# Patient Record
Sex: MALE | Race: WHITE | HISPANIC OR LATINO
[De-identification: names, ages, dates, MRNs, and addresses within clinical notes are randomized per-mention and may not be internally consistent; named-entity substitution may affect disease eponyms.]

---

## 2017-03-14 ENCOUNTER — APPOINTMENT (OUTPATIENT)
Dept: HEART AND VASCULAR | Facility: CLINIC | Age: 67
End: 2017-03-14

## 2017-06-13 ENCOUNTER — OUTPATIENT (OUTPATIENT)
Dept: OUTPATIENT SERVICES | Facility: HOSPITAL | Age: 67
LOS: 1 days | Discharge: HOME | End: 2017-06-13
Payer: MEDICARE

## 2017-06-13 ENCOUNTER — APPOINTMENT (OUTPATIENT)
Dept: CT IMAGING | Facility: HOSPITAL | Age: 67
End: 2017-06-13

## 2017-06-13 DIAGNOSIS — C71.9 MALIGNANT NEOPLASM OF BRAIN, UNSPECIFIED: ICD-10-CM

## 2017-06-13 PROCEDURE — 70450 CT HEAD/BRAIN W/O DYE: CPT | Mod: 26

## 2017-06-19 ENCOUNTER — INPATIENT (INPATIENT)
Facility: HOSPITAL | Age: 67
LOS: 3 days | Discharge: NURSING, REHAB OR LONG-TERM CARE | End: 2017-06-23
Attending: INTERNAL MEDICINE | Admitting: INTERNAL MEDICINE
Payer: MEDICARE

## 2017-06-19 VITALS
RESPIRATION RATE: 16 BRPM | OXYGEN SATURATION: 100 % | HEIGHT: 68 IN | DIASTOLIC BLOOD PRESSURE: 84 MMHG | SYSTOLIC BLOOD PRESSURE: 140 MMHG | HEART RATE: 80 BPM

## 2017-06-19 LAB
ALBUMIN SERPL ELPH-MCNC: 3.7 G/DL — SIGNIFICANT CHANGE UP (ref 3.3–5)
ALP SERPL-CCNC: 95 U/L — SIGNIFICANT CHANGE UP (ref 40–120)
ALT FLD-CCNC: 17 U/L — SIGNIFICANT CHANGE UP (ref 12–78)
ANION GAP SERPL CALC-SCNC: 9 MMOL/L — SIGNIFICANT CHANGE UP (ref 5–17)
APPEARANCE UR: CLEAR — SIGNIFICANT CHANGE UP
APTT BLD: 28 SEC — SIGNIFICANT CHANGE UP (ref 27.5–37.4)
AST SERPL-CCNC: 19 U/L — SIGNIFICANT CHANGE UP (ref 15–37)
BACTERIA # UR AUTO: ABNORMAL
BASOPHILS # BLD AUTO: 0.1 K/UL — SIGNIFICANT CHANGE UP (ref 0–0.2)
BASOPHILS NFR BLD AUTO: 1.9 % — SIGNIFICANT CHANGE UP (ref 0–2)
BILIRUB SERPL-MCNC: 0.4 MG/DL — SIGNIFICANT CHANGE UP (ref 0.2–1.2)
BILIRUB UR-MCNC: NEGATIVE — SIGNIFICANT CHANGE UP
BLD GP AB SCN SERPL QL: SIGNIFICANT CHANGE UP
BUN SERPL-MCNC: 23 MG/DL — SIGNIFICANT CHANGE UP (ref 7–23)
CALCIUM SERPL-MCNC: 8.8 MG/DL — SIGNIFICANT CHANGE UP (ref 8.5–10.1)
CHLORIDE SERPL-SCNC: 106 MMOL/L — SIGNIFICANT CHANGE UP (ref 96–108)
CO2 SERPL-SCNC: 28 MMOL/L — SIGNIFICANT CHANGE UP (ref 22–31)
COLOR SPEC: YELLOW — SIGNIFICANT CHANGE UP
CREAT SERPL-MCNC: 1.17 MG/DL — SIGNIFICANT CHANGE UP (ref 0.5–1.3)
DIFF PNL FLD: ABNORMAL
EOSINOPHIL # BLD AUTO: 0.2 K/UL — SIGNIFICANT CHANGE UP (ref 0–0.5)
EOSINOPHIL NFR BLD AUTO: 3.1 % — SIGNIFICANT CHANGE UP (ref 0–6)
EPI CELLS # UR: SIGNIFICANT CHANGE UP
ERYTHROCYTE [SEDIMENTATION RATE] IN BLOOD: 18 MM/HR — SIGNIFICANT CHANGE UP (ref 0–20)
GLUCOSE SERPL-MCNC: 142 MG/DL — HIGH (ref 70–99)
GLUCOSE UR QL: NEGATIVE MG/DL — SIGNIFICANT CHANGE UP
HCT VFR BLD CALC: 37.2 % — LOW (ref 39–50)
HGB BLD-MCNC: 13.7 G/DL — SIGNIFICANT CHANGE UP (ref 13–17)
INR BLD: 1.07 RATIO — SIGNIFICANT CHANGE UP (ref 0.88–1.16)
KETONES UR-MCNC: NEGATIVE — SIGNIFICANT CHANGE UP
LACTATE SERPL-SCNC: 1.9 MMOL/L — SIGNIFICANT CHANGE UP (ref 0.7–2)
LEUKOCYTE ESTERASE UR-ACNC: NEGATIVE — SIGNIFICANT CHANGE UP
LYMPHOCYTES # BLD AUTO: 1 K/UL — SIGNIFICANT CHANGE UP (ref 1–3.3)
LYMPHOCYTES # BLD AUTO: 17.3 % — SIGNIFICANT CHANGE UP (ref 13–44)
MCHC RBC-ENTMCNC: 31.3 PG — SIGNIFICANT CHANGE UP (ref 27–34)
MCHC RBC-ENTMCNC: 36.8 GM/DL — HIGH (ref 32–36)
MCV RBC AUTO: 84.9 FL — SIGNIFICANT CHANGE UP (ref 80–100)
MONOCYTES # BLD AUTO: 0.4 K/UL — SIGNIFICANT CHANGE UP (ref 0–0.9)
MONOCYTES NFR BLD AUTO: 7.7 % — SIGNIFICANT CHANGE UP (ref 2–14)
NEUTROPHILS # BLD AUTO: 3.9 K/UL — SIGNIFICANT CHANGE UP (ref 1.8–7.4)
NEUTROPHILS NFR BLD AUTO: 70.1 % — SIGNIFICANT CHANGE UP (ref 43–77)
NITRITE UR-MCNC: NEGATIVE — SIGNIFICANT CHANGE UP
PH UR: 5 — SIGNIFICANT CHANGE UP (ref 5–8)
PLATELET # BLD AUTO: 288 K/UL — SIGNIFICANT CHANGE UP (ref 150–400)
POTASSIUM SERPL-MCNC: 3.8 MMOL/L — SIGNIFICANT CHANGE UP (ref 3.5–5.3)
POTASSIUM SERPL-SCNC: 3.8 MMOL/L — SIGNIFICANT CHANGE UP (ref 3.5–5.3)
PROT SERPL-MCNC: 7.2 GM/DL — SIGNIFICANT CHANGE UP (ref 6–8.3)
PROT UR-MCNC: 15 MG/DL
PROTHROM AB SERPL-ACNC: 11.7 SEC — SIGNIFICANT CHANGE UP (ref 9.8–12.7)
RBC # BLD: 4.38 M/UL — SIGNIFICANT CHANGE UP (ref 4.2–5.8)
RBC # FLD: 11.8 % — SIGNIFICANT CHANGE UP (ref 11–15)
RBC CASTS # UR COMP ASSIST: ABNORMAL /HPF (ref 0–4)
SODIUM SERPL-SCNC: 143 MMOL/L — SIGNIFICANT CHANGE UP (ref 135–145)
SP GR SPEC: 1.02 — SIGNIFICANT CHANGE UP (ref 1.01–1.02)
TROPONIN I SERPL-MCNC: <.015 NG/ML — SIGNIFICANT CHANGE UP (ref 0.01–0.04)
TSH SERPL-MCNC: 1.67 UIU/ML — SIGNIFICANT CHANGE UP (ref 0.36–3.74)
UROBILINOGEN FLD QL: NEGATIVE MG/DL — SIGNIFICANT CHANGE UP
WBC # BLD: 5.6 K/UL — SIGNIFICANT CHANGE UP (ref 3.8–10.5)
WBC # FLD AUTO: 5.6 K/UL — SIGNIFICANT CHANGE UP (ref 3.8–10.5)
WBC UR QL: SIGNIFICANT CHANGE UP

## 2017-06-19 PROCEDURE — 70450 CT HEAD/BRAIN W/O DYE: CPT | Mod: 26

## 2017-06-19 PROCEDURE — 71010: CPT | Mod: 26

## 2017-06-19 PROCEDURE — 99291 CRITICAL CARE FIRST HOUR: CPT

## 2017-06-19 RX ORDER — DEXTROSE 50 % IN WATER 50 %
1 SYRINGE (ML) INTRAVENOUS ONCE
Qty: 0 | Refills: 0 | Status: DISCONTINUED | OUTPATIENT
Start: 2017-06-19 | End: 2017-06-23

## 2017-06-19 RX ORDER — INSULIN LISPRO 100/ML
VIAL (ML) SUBCUTANEOUS AT BEDTIME
Qty: 0 | Refills: 0 | Status: DISCONTINUED | OUTPATIENT
Start: 2017-06-19 | End: 2017-06-23

## 2017-06-19 RX ORDER — GLUCAGON INJECTION, SOLUTION 0.5 MG/.1ML
1 INJECTION, SOLUTION SUBCUTANEOUS ONCE
Qty: 0 | Refills: 0 | Status: DISCONTINUED | OUTPATIENT
Start: 2017-06-19 | End: 2017-06-23

## 2017-06-19 RX ORDER — VALSARTAN 80 MG/1
320 TABLET ORAL DAILY
Qty: 0 | Refills: 0 | Status: DISCONTINUED | OUTPATIENT
Start: 2017-06-19 | End: 2017-06-23

## 2017-06-19 RX ORDER — ENOXAPARIN SODIUM 100 MG/ML
40 INJECTION SUBCUTANEOUS DAILY
Qty: 0 | Refills: 0 | Status: DISCONTINUED | OUTPATIENT
Start: 2017-06-19 | End: 2017-06-23

## 2017-06-19 RX ORDER — ATORVASTATIN CALCIUM 80 MG/1
40 TABLET, FILM COATED ORAL AT BEDTIME
Qty: 0 | Refills: 0 | Status: DISCONTINUED | OUTPATIENT
Start: 2017-06-19 | End: 2017-06-23

## 2017-06-19 RX ORDER — DEXTROSE 50 % IN WATER 50 %
12.5 SYRINGE (ML) INTRAVENOUS ONCE
Qty: 0 | Refills: 0 | Status: DISCONTINUED | OUTPATIENT
Start: 2017-06-19 | End: 2017-06-23

## 2017-06-19 RX ORDER — DEXTROSE 50 % IN WATER 50 %
25 SYRINGE (ML) INTRAVENOUS ONCE
Qty: 0 | Refills: 0 | Status: DISCONTINUED | OUTPATIENT
Start: 2017-06-19 | End: 2017-06-23

## 2017-06-19 RX ORDER — ASPIRIN/CALCIUM CARB/MAGNESIUM 325 MG
325 TABLET ORAL DAILY
Qty: 0 | Refills: 0 | Status: DISCONTINUED | OUTPATIENT
Start: 2017-06-19 | End: 2017-06-23

## 2017-06-19 RX ORDER — INSULIN LISPRO 100/ML
VIAL (ML) SUBCUTANEOUS
Qty: 0 | Refills: 0 | Status: DISCONTINUED | OUTPATIENT
Start: 2017-06-19 | End: 2017-06-23

## 2017-06-19 RX ORDER — SODIUM CHLORIDE 9 MG/ML
1000 INJECTION, SOLUTION INTRAVENOUS
Qty: 0 | Refills: 0 | Status: DISCONTINUED | OUTPATIENT
Start: 2017-06-19 | End: 2017-06-23

## 2017-06-19 NOTE — ED ADULT NURSE NOTE - OBJECTIVE STATEMENT
per triage pt sent here to r/o CVA hx of recent cva with left sided weakness,  not speaking as usual

## 2017-06-19 NOTE — ED PROVIDER NOTE - MEDICAL DECISION MAKING DETAILS
after coming back from Ct, pt now more alert and answering questions. neurology on bard, not tpa canditate, possible syncope vs sz, vs stroke.

## 2017-06-19 NOTE — ED PROVIDER NOTE - PHYSICAL EXAMINATION
GEN: drowsy, mumbling but after CT now awake following commands and answering questions  HEAD: atraumatic, EOMI, PERRL, normal lids/conjunctiva  ENT: normal hearing, patent oropharynx without erythema/exudate, uvula midline, mild L facial droop seen unknown if new vs old  NECK: +supple, no tenderness/meningismus, +Trachea midline  PULM: Bilateral BS, normal resp effort, no wheeze/stridor/retractions  CV: RRR, no M/R/G, +dist ext pulses  ABD: soft, NT/ND  BACK: no CVAT, no midline tenderness  MSK: no edema/erythema/cyanosis  SKIN: no rash  NEURO: Alert, L sided weakness (motor +2) more than R (+3), L facial droop mild, mild aphasia GEN: drowsy, mumbling but after CT now awake following commands and answering questions  HEAD: atraumatic, EOMI, PERRL, normal lids/conjunctiva  ENT: normal hearing, patent oropharynx without erythema/exudate, uvula midline, mild L facial droop seen unknown if new vs old  NECK: +supple, no tenderness/meningismus, +Trachea midline  PULM: Bilateral BS, normal resp effort, no wheeze/stridor/retractions  CV: RRR, no M/R/G, +dist ext pulses  ABD: soft, NT/ND  BACK: no CVAT, no midline tenderness  MSK: no edema/erythema/cyanosis  SKIN: no rash  NEURO: Alert, L sided weakness (motor +2) more than R (+3), L facial droop mild, mild aphasia  NHSS=18

## 2017-06-19 NOTE — ED PROVIDER NOTE - PRESENTING SYMPTOMS DETAILS
67M w htn, hld, dm, cva w residual L sided weakness/aphasia, dementia, recent L basal ganglia stroke coming from [facility] comes in for possible strkoe vs syncope. per family , since dx of stroke a few days ago pt has gradually deteriorated now has R sided weakness from the stroke. yesterday noticed dysarthria that was worsening and today saw pt and he was not waking up, no sz-like activity, since then has not been very responsive. pt is moving parts of his extremities w pain stimulation, eyes are closed, airway protected and mumbles.  ROS - limited  Wife - Eveline [phone]

## 2017-06-19 NOTE — ED ADULT NURSE NOTE - PMH
CVA (cerebral vascular accident)    Diabetes    Hemiparesis affecting left side as late effect of stroke    HTN (hypertension)    Hypercholesteremia

## 2017-06-19 NOTE — ED ADULT TRIAGE NOTE - CHIEF COMPLAINT QUOTE
Stroke last week, sent By [facility] for sudden onset of no talking with family at 330p

## 2017-06-19 NOTE — H&P ADULT - NSHPPHYSICALEXAM_GEN_ALL_CORE
PHYSICAL EXAM:    GENERAL: NAD,   HEAD:  Atraumatic, Normocephalic  EYES:, PERRLA, conjunctiva and sclera clear  ENMT  clear oropharynx  NECK: Supple, No JVD, Normal thyroid  NERVOUS SYSTEM:  somnolent  arouseable  L  hemiparesis  decreased  strength  L  upper  extr  CHEST/LUNG: Clear  bilaterally; No rales, rhonchi, wheezing, or rubs  HEART: Regular rate and rhythm; No murmurs, rubs, or gallops  ABDOMEN: Soft, Nontender, Nondistended; no  masses Bowel sounds present  EXTREMITIES:  + Peripheral Pulses, No clubbing, cyanosis, or edema  LYMPH: No lymphadenopathy noted   RECTAL: deferred  SKIN: No rashes or lesions

## 2017-06-19 NOTE — H&P ADULT - HISTORY OF PRESENT ILLNESS
patient  with VA  L  hemiparesis  presented  with  recent  new  L  cva  2 weeks  ago  presented  with  unresponsiveness  rt  sided  weakness evaluated  in  ER  admitted  for  further w/u  and  treatment

## 2017-06-19 NOTE — ED ADULT NURSE NOTE - CHIEF COMPLAINT QUOTE
Stroke last week, sent By [facility] for sudden onset of no talking with family at 330p

## 2017-06-19 NOTE — H&P ADULT - NSHPLABSRESULTS_GEN_ALL_CORE
LABS:                        13.7   5.6   )-----------( 288      ( 2017 17:05 )             37.2     -    143  |  106  |  23  ----------------------------<  142<H>  3.8   |  28  |  1.17    Ca    8.8      2017 17:05    TPro  7.2  /  Alb  3.7  /  TBili  0.4  /  DBili  x   /  AST  19  /  ALT  17  /  AlkPhos  95  -    PT/INR - ( 2017 17:06 )   PT: 11.7 sec;   INR: 1.07 ratio         PTT - ( 2017 17:06 )  PTT:28.0 sec  Urinalysis Basic - ( 2017 18:32 )    Color: Yellow / Appearance: Clear / S.020 / pH: x  Gluc: x / Ketone: Negative  / Bili: Negative / Urobili: Negative mg/dL   Blood: x / Protein: 15 mg/dL / Nitrite: Negative   Leuk Esterase: Negative / RBC: 25-50 /HPF / WBC 3-5   Sq Epi: x / Non Sq Epi: Few / Bacteria: Few      EXAM:  CT BRAIN                            PROCEDURE DATE:  2017        INTERPRETATION:  Clinical: Stroke protocol    Technique: Axial CT tomographic sections of the head were performed   without contrast from the vertex to the foramen magnum. Additional   reformatted parasagittal and coronal MIP reconstructions were performed.     No intravenous contrast was administered.    Comparisons: CT brain dated 2017    Findings: The ventricular system demonstrates moderate generalized   dilatation. There is no evidence of midline shift, mass effect or   extra-axial fluid collections.     Cortical gyri and sulci demonstrate atrophy consistent with age.   Periventricular low density changes are present throughout the deep white   matter consistent with involutional changes of aging and microvascular   disease.. Old infarction is present in the medial aspect of the right   temporal lobe and right basal ganglia unchanged. There are no acute   density changes in the brain. There is no evidence of vasogenic edema,   mass or hemorrhage.     Posterior fossa, brainstem and cerebellum are normal. No masses in the   cerebellopontine angles. Bone window examination of the calvarium is   unremarkable.    Impression: Atrophy and microvascular disease consistent with age. Old   CVA right temporal lobe and right basal ganglia unchanged.

## 2017-06-20 DIAGNOSIS — I10 ESSENTIAL (PRIMARY) HYPERTENSION: ICD-10-CM

## 2017-06-20 DIAGNOSIS — I69.354 HEMIPLEGIA AND HEMIPARESIS FOLLOWING CEREBRAL INFARCTION AFFECTING LEFT NON-DOMINANT SIDE: ICD-10-CM

## 2017-06-20 DIAGNOSIS — R41.89 OTHER SYMPTOMS AND SIGNS INVOLVING COGNITIVE FUNCTIONS AND AWARENESS: ICD-10-CM

## 2017-06-20 DIAGNOSIS — I63.9 CEREBRAL INFARCTION, UNSPECIFIED: ICD-10-CM

## 2017-06-20 DIAGNOSIS — E11.9 TYPE 2 DIABETES MELLITUS WITHOUT COMPLICATIONS: ICD-10-CM

## 2017-06-20 LAB
CHOLEST SERPL-MCNC: 136 MG/DL — SIGNIFICANT CHANGE UP (ref 10–199)
CULTURE RESULTS: NO GROWTH — SIGNIFICANT CHANGE UP
HBA1C BLD-MCNC: 6.9 % — HIGH (ref 4–5.6)
HDLC SERPL-MCNC: 34 MG/DL — LOW (ref 40–125)
LIPID PNL WITH DIRECT LDL SERPL: 59 MG/DL — SIGNIFICANT CHANGE UP
SPECIMEN SOURCE: SIGNIFICANT CHANGE UP
T3 SERPL-MCNC: 90 NG/DL — SIGNIFICANT CHANGE UP (ref 80–200)
T4 AB SER-ACNC: 8.3 UG/DL — SIGNIFICANT CHANGE UP (ref 4.6–12)
TOTAL CHOLESTEROL/HDL RATIO MEASUREMENT: 4 RATIO — SIGNIFICANT CHANGE UP (ref 3.4–9.6)
TRIGL SERPL-MCNC: 216 MG/DL — HIGH (ref 10–149)

## 2017-06-20 PROCEDURE — 70551 MRI BRAIN STEM W/O DYE: CPT | Mod: 26

## 2017-06-20 PROCEDURE — 93880 EXTRACRANIAL BILAT STUDY: CPT | Mod: 26

## 2017-06-20 RX ORDER — AMLODIPINE BESYLATE 2.5 MG/1
10 TABLET ORAL DAILY
Qty: 0 | Refills: 0 | Status: DISCONTINUED | OUTPATIENT
Start: 2017-06-20 | End: 2017-06-23

## 2017-06-20 RX ORDER — WARFARIN SODIUM 2.5 MG/1
2.5 TABLET ORAL ONCE
Qty: 0 | Refills: 0 | Status: COMPLETED | OUTPATIENT
Start: 2017-06-20 | End: 2017-06-20

## 2017-06-20 RX ADMIN — Medication 325 MILLIGRAM(S): at 13:00

## 2017-06-20 RX ADMIN — ENOXAPARIN SODIUM 40 MILLIGRAM(S): 100 INJECTION SUBCUTANEOUS at 12:12

## 2017-06-20 RX ADMIN — Medication 1: at 12:11

## 2017-06-20 RX ADMIN — Medication 1: at 07:39

## 2017-06-20 RX ADMIN — Medication 2: at 17:41

## 2017-06-20 RX ADMIN — VALSARTAN 320 MILLIGRAM(S): 80 TABLET ORAL at 06:12

## 2017-06-20 RX ADMIN — WARFARIN SODIUM 2.5 MILLIGRAM(S): 2.5 TABLET ORAL at 23:08

## 2017-06-20 RX ADMIN — ATORVASTATIN CALCIUM 40 MILLIGRAM(S): 80 TABLET, FILM COATED ORAL at 00:27

## 2017-06-20 RX ADMIN — AMLODIPINE BESYLATE 10 MILLIGRAM(S): 2.5 TABLET ORAL at 06:16

## 2017-06-20 RX ADMIN — ATORVASTATIN CALCIUM 40 MILLIGRAM(S): 80 TABLET, FILM COATED ORAL at 23:08

## 2017-06-21 LAB
ALBUMIN SERPL ELPH-MCNC: 3.7 G/DL — SIGNIFICANT CHANGE UP (ref 3.3–5)
ALP SERPL-CCNC: 111 U/L — SIGNIFICANT CHANGE UP (ref 40–120)
ALT FLD-CCNC: 18 U/L — SIGNIFICANT CHANGE UP (ref 12–78)
ANION GAP SERPL CALC-SCNC: 10 MMOL/L — SIGNIFICANT CHANGE UP (ref 5–17)
APTT BLD: 28.7 SEC — SIGNIFICANT CHANGE UP (ref 27.5–37.4)
AST SERPL-CCNC: 20 U/L — SIGNIFICANT CHANGE UP (ref 15–37)
BILIRUB SERPL-MCNC: 0.7 MG/DL — SIGNIFICANT CHANGE UP (ref 0.2–1.2)
BUN SERPL-MCNC: 17 MG/DL — SIGNIFICANT CHANGE UP (ref 7–23)
CALCIUM SERPL-MCNC: 9 MG/DL — SIGNIFICANT CHANGE UP (ref 8.5–10.1)
CHLORIDE SERPL-SCNC: 105 MMOL/L — SIGNIFICANT CHANGE UP (ref 96–108)
CO2 SERPL-SCNC: 25 MMOL/L — SIGNIFICANT CHANGE UP (ref 22–31)
CREAT SERPL-MCNC: 1.04 MG/DL — SIGNIFICANT CHANGE UP (ref 0.5–1.3)
GLUCOSE SERPL-MCNC: 213 MG/DL — HIGH (ref 70–99)
HCT VFR BLD CALC: 39.2 % — SIGNIFICANT CHANGE UP (ref 39–50)
HGB BLD-MCNC: 14.5 G/DL — SIGNIFICANT CHANGE UP (ref 13–17)
INR BLD: 1.13 RATIO — SIGNIFICANT CHANGE UP (ref 0.88–1.16)
MCHC RBC-ENTMCNC: 30.9 PG — SIGNIFICANT CHANGE UP (ref 27–34)
MCHC RBC-ENTMCNC: 37.1 GM/DL — HIGH (ref 32–36)
MCV RBC AUTO: 83.1 FL — SIGNIFICANT CHANGE UP (ref 80–100)
NT-PROBNP SERPL-SCNC: 477 PG/ML — HIGH (ref 0–125)
PLATELET # BLD AUTO: 295 K/UL — SIGNIFICANT CHANGE UP (ref 150–400)
POTASSIUM SERPL-MCNC: 3.7 MMOL/L — SIGNIFICANT CHANGE UP (ref 3.5–5.3)
POTASSIUM SERPL-SCNC: 3.7 MMOL/L — SIGNIFICANT CHANGE UP (ref 3.5–5.3)
PROT SERPL-MCNC: 7.6 GM/DL — SIGNIFICANT CHANGE UP (ref 6–8.3)
PROTHROM AB SERPL-ACNC: 12.3 SEC — SIGNIFICANT CHANGE UP (ref 9.8–12.7)
RBC # BLD: 4.71 M/UL — SIGNIFICANT CHANGE UP (ref 4.2–5.8)
RBC # FLD: 11.2 % — SIGNIFICANT CHANGE UP (ref 11–15)
SODIUM SERPL-SCNC: 140 MMOL/L — SIGNIFICANT CHANGE UP (ref 135–145)
WBC # BLD: 8.6 K/UL — SIGNIFICANT CHANGE UP (ref 3.8–10.5)
WBC # FLD AUTO: 8.6 K/UL — SIGNIFICANT CHANGE UP (ref 3.8–10.5)

## 2017-06-21 RX ORDER — METOPROLOL TARTRATE 50 MG
5 TABLET ORAL ONCE
Qty: 0 | Refills: 0 | Status: COMPLETED | OUTPATIENT
Start: 2017-06-21 | End: 2017-06-21

## 2017-06-21 RX ADMIN — Medication 3: at 11:02

## 2017-06-21 RX ADMIN — Medication 5 MILLIGRAM(S): at 20:13

## 2017-06-21 RX ADMIN — Medication 2: at 15:52

## 2017-06-21 RX ADMIN — ENOXAPARIN SODIUM 40 MILLIGRAM(S): 100 INJECTION SUBCUTANEOUS at 11:02

## 2017-06-21 RX ADMIN — VALSARTAN 320 MILLIGRAM(S): 80 TABLET ORAL at 05:22

## 2017-06-21 RX ADMIN — AMLODIPINE BESYLATE 10 MILLIGRAM(S): 2.5 TABLET ORAL at 05:22

## 2017-06-21 RX ADMIN — Medication 2: at 08:17

## 2017-06-21 RX ADMIN — Medication 325 MILLIGRAM(S): at 11:02

## 2017-06-21 RX ADMIN — ATORVASTATIN CALCIUM 40 MILLIGRAM(S): 80 TABLET, FILM COATED ORAL at 23:01

## 2017-06-21 NOTE — PROGRESS NOTE ADULT - SUBJECTIVE AND OBJECTIVE BOX
INTERVAL HPI/OVERNIGHT EVENTS:        REVIEW OF SYSTEMS:  CONSTITUTIONAL:  patient  somnolent  arouseable  poorly  communicative    MEDICATION:  valsartan 320milliGRAM(s) Oral daily  aspirin buffered 325milliGRAM(s) Oral daily  atorvastatin 40milliGRAM(s) Oral at bedtime  enoxaparin Injectable 40milliGRAM(s) SubCutaneous daily  insulin lispro (HumaLOG) corrective regimen sliding scale  SubCutaneous three times a day before meals  insulin lispro (HumaLOG) corrective regimen sliding scale  SubCutaneous at bedtime  dextrose 5%. 1000milliLiter(s) IV Continuous <Continuous>  dextrose Gel 1Dose(s) Oral once PRN  dextrose 50% Injectable 12.5Gram(s) IV Push once  dextrose 50% Injectable 25Gram(s) IV Push once  dextrose 50% Injectable 25Gram(s) IV Push once  glucagon  Injectable 1milliGRAM(s) IntraMuscular once PRN  amLODIPine   Tablet 10milliGRAM(s) Oral daily    Vital Signs Last 24 Hrs  T(C): 37.1, Max: 37.7 ( @ 17:44)  T(F): 98.8, Max: 99.8 ( @ 17:44)  HR: 108 (94 - 108)  BP: 174/98 (147/93 - 174/98)  BP(mean): --  RR: 20 (17 - 20)  SpO2: 95% (95% - 98%)    PHYSICAL EXAM:  GENERAL: NAD,   EYES:  conjunctiva and sclera clear  ENMT:  Moist mucous membranes,   NECK: Supple, No JVD, Normal thyroid  NERVOUS SYSTEM:  somnolent   arouseble  poorly communicative  L  hemiparesis ;   CHEST/LUNG: Clear    HEART: Regular rate and rhythm; No murmurs, rubs, or gallops  ABDOMEN: Soft, Nontender, Nondistended; Bowel sounds present  EXTREMITIES:  no  edema no  tenderness  SKIN: No rashes   LABS:                        13.7   5.6   )-----------( 288      ( 2017 17:05 )             37.2     -    143  |  106  |  23  ----------------------------<  142<H>  3.8   |  28  |  1.17    Ca    8.8      2017 17:05    TPro  7.2  /  Alb  3.7  /  TBili  0.4  /  DBili  x   /  AST  19  /  ALT  17  /  AlkPhos  95  06-19    PT/INR - ( 2017 17:06 )   PT: 11.7 sec;   INR: 1.07 ratio         PTT - ( 2017 17:06 )  PTT:28.0 sec  Urinalysis Basic - ( 2017 18:32 )    Color: Yellow / Appearance: Clear / S.020 / pH: x  Gluc: x / Ketone: Negative  / Bili: Negative / Urobili: Negative mg/dL   Blood: x / Protein: 15 mg/dL / Nitrite: Negative   Leuk Esterase: Negative / RBC: 25-50 /HPF / WBC 3-5   Sq Epi: x / Non Sq Epi: Few / Bacteria: Few      CAPILLARY BLOOD GLUCOSE  183 (2017 23:07)  220 (2017 17:40)  171 (2017 12:09)  161 (2017 07:37)      RADIOLOGY & ADDITIONAL TESTS:    Imaging reports  Personally Reviewed:  [x ] YES  [ ] NO    Consultant(s) Notes Reviewed:  [ ] YES  [ ] NO    Care Discussed with Consultants/Other Providers [x ] YES  [ ] NO  AM:  MRI BRAIN W O CONT                            PROCEDURE DATE:  2017        INTERPRETATION:  INDICATION:    Stroke  TECHNIQUE:  Multiplanar brain imaging was conducted using a 1.5 Kimberly   magnet.  T1, T2 and FLAIR imaging was performed. In addition, diffusion   imaging, diffusion coefficient assessment (ADC) and echo planar T2* was   incorporated.    COMPARISON EXAMINATION:  CT dated 2017    FINDINGS:  HEMISPHERES:  No diffusion restriction or acute ischemia is noted. There   are extensive chronic ischemic changes in both hemispheres. There are   multiple lacunar infarcts in the basal ganglia thalami and   periventricular white matter. Several appear to have been hemorrhagic as   there is hemosiderin deposition. Also thereare chronic ischemic changes   more prevalent in the right MCA distribution posteriorly.  VENTRICLES:  Ex vacuo enlargement is seen  POSTERIOR FOSSA:  No acute infarct or hemorrhage is noted. Chronic   ischemic changes are noted in the right paracentral catie. There is   Wallerian degeneration in the right midbrain.  EXTRA-AXIAL:  No mass or collections are depicted.  CRANIAL NERVES:  No abnormality noted.  VASCULATURE:  The major vessels and venous sinuses are grossly patent.    SINUSES AND MASTOIDS:  Clear.  MISCELLANEOUS:  No orbital or pituitary abnormality noted.  No skull base   lesion suggested.    IMPRESSION:      1)  extensive chronic ischemic changes throughout both hemispheres more   severe in the right hemisphere. See above. No diffusion restriction or MR   evidence of acute ischemia  2)  chronic ischemic changes in the right brainstem. Wallerian   degeneration also noted in the right midbrain.    No acute abnormality suggested..     TTE   Summary:   1. Left ventricular ejection fraction, by visual estimation, is 60 to   65%.   2. Spectral Doppler shows restrictive pattern of left ventricular   myocardial filling (Grade III diastolic dysfunction).   3. Normal right ventricular size and function.   4. The left atrium is normal in size.   5. The right atrium is normal in size.   6. There is no evidence of pericardial effusion.   7. Structurally normal mitral valve, with normal leaflet excursion.   8. Trace mitral valve regurgitation.   9. Structurally normal tricuspid valve, with normal leaflet excursion.  10. Normal trileaflet aortic valve with normal opening.      INTERPRETATION:  Carotid duplex Doppler ultrasound    Indication: Altered mental status    Carotid duplex Doppler ultrasound is performed. Grayscale ultrasound   demonstrates mild atherosclerotic plaques in the left carotid bulb and   proximal right internal carotid artery.. The flow velocity measurements   during peak systolic phase in centimeters per second are as the following:

## 2017-06-21 NOTE — CONSULT NOTE ADULT - SUBJECTIVE AND OBJECTIVE BOX
Subjective Complaints:  Historian:       Consult requested by ER doctor:                  Attending:     HPI:  patient  with VA  L  hemiparesis  presented  with  recent  new  L  cva  2 weeks  ago  presented  with  unresponsiveness  rt  sided  weakness evaluated  in  ER  admitted  for  further w/u  and  treatment (19 Jun 2017 19:13)    BRANDI MUHAMMAD    PAST MEDICAL & SURGICAL HISTORY:  Hemiparesis affecting left side as late effect of stroke  Hypercholesteremia  Diabetes  CVA (cerebral vascular accident)  HTN (hypertension)  No significant past surgical history  No significant past surgical history  67yMale    MEDICATIONS  (STANDING):  valsartan 320milliGRAM(s) Oral daily  aspirin buffered 325milliGRAM(s) Oral daily  atorvastatin 40milliGRAM(s) Oral at bedtime  enoxaparin Injectable 40milliGRAM(s) SubCutaneous daily  insulin lispro (HumaLOG) corrective regimen sliding scale  SubCutaneous three times a day before meals  insulin lispro (HumaLOG) corrective regimen sliding scale  SubCutaneous at bedtime  dextrose 5%. 1000milliLiter(s) IV Continuous <Continuous>  dextrose 50% Injectable 12.5Gram(s) IV Push once  dextrose 50% Injectable 25Gram(s) IV Push once  dextrose 50% Injectable 25Gram(s) IV Push once  amLODIPine   Tablet 10milliGRAM(s) Oral daily    MEDICATIONS  (PRN):  dextrose Gel 1Dose(s) Oral once PRN Blood Glucose LESS THAN 70 milliGRAM(s)/deciliter  glucagon  Injectable 1milliGRAM(s) IntraMuscular once PRN Glucose LESS THAN 70 milligrams/deciliter      Allergies    No Known Allergies    Intolerances      FAMILY HISTORY:  No pertinent family history in first degree relatives  No significant family history      REVIEW OF SYSTEMS:  General:  No wt loss, fevers, chills, night sweats  Eyes:  Good vision, no reported pain  ENT:  No sore throat, pain, runny nose, dysphagia  CV:  No pain, palpitatioins, hypo/hypertension  Resp:  No dyspnea, cough, tachypnea, wheezing  GI:  No pain, nausea, vomiting, diarrhea, constipatiion  :  No pain, bleeding, incontinence, nocturia  Muscle:  No pain, weakness  Breast:  No pain, abscess, mass, discharge  Neuro:  left sided weakness  Psych:  No fatigue, insomnia, mood problems, depression  Endocrine:  No polyuria, polydypsia, cold/heat intolerance  Heme:  No petechiae, ecchymosis, easy bruisability  Skin:  No rash, tattoos, scars, edema      Vital Signs Last 24 Hrs  T(C): 37, Max: 37.2 (06-21 @ 01:26)  T(F): 98.6, Max: 98.9 (06-21 @ 01:26)  HR: 105 (94 - 108)  BP: 163/105 (147/93 - 174/98)  BP(mean): --  RR: 16 (16 - 20)  SpO2: 98% (95% - 98%)    GENERAL PHYSICAL EXAM:  General:  Appears stated age, well-groomed, well-nourished, no distress  HEENT:  NC/AT, patent nares w/ pink mucosa, OP clear w/o lesions, PERRL, EOMI, conjunctivae clear, no thyromegaly, nodules, adenopathy, no JVD  Chest:  Full & symmetric excursion, no increased effort, breath sounds clear  Cardiovascular:  Regular rhythm, S1, S2, no murmur/rub/S3/S4, no carotid/femoral/abdominal bruit, radial/pedal pulses 2+, no edema  Abdomen:  Soft, non-tender, non-distended, normoactive bowel sounds, no HSM  Extremities: left wrist contracture  Skin:  No rash/erythema/ecchymoses/petechiae/wounds/abscess/warm/dry    NEUROLOGICAL EXAM:  HENT:  Normocephalic head; atraumatic head.  Neck supple.  ENT: normal looking.  Mental State:    Awake,. paucity of speech able to comprehend spoken language  Cranial Nerves:  II-XII:   Pupils round and reactive to light and accommodation.  Extraocular movements full.  Visual fields full (no homonymous hemianopsia).  Visual acuity wnl.  Facial symmetry intact.  Tongue midline.  Motor Functions: left upper extremity 2/5 with contracture at the wrist left lower extremity is 2/5 right recurrent episodes of tremors right lower extremity is 3-4/5    Sensory Functions: unreliable   Reflexes:  Deep tendon reflexes normoactive to biceps, knees and ankles.  Babinski absent (present).  Cerebellar Testing:    Finger to nose intact.  Nystagmus absent.Gait unable to stand    LABS:                        14.5   8.6   )-----------( 295      ( 21 Jun 2017 07:15 )             39.2     06-21    140  |  105  |  17  ----------------------------<  213<H>  3.7   |  25  |  1.04    Ca    9.0      21 Jun 2017 07:15    TPro  7.6  /  Alb  3.7  /  TBili  0.7  /  DBili  x   /  AST  20  /  ALT  18  /  AlkPhos  111  06-21    PT/INR - ( 21 Jun 2017 07:15 )   PT: 12.3 sec;   INR: 1.13 ratio         PTT - ( 21 Jun 2017 07:15 )  PTT:28.7 sec        RADIOLOGY & ADDITIONAL STUDIES:    Prothrombin Time and INR, Plasma:  Start Date:21-Jun-2017. 05:00 (06-21 @ 05:39)  Activated Partial Thromboplastin Time:  Start Date:21-Jun-2017. 05:00 (06-21 @ 05:39)  EEG Awake or Drowsy:   Transport: Stretcher-Crib  Hemorrhage:No  Brain Death: No  MI:No  Sickle Cell:No   Stimulants:No  Anti-Convulsants:No   Anti-Depressants:No  Contr Substances:No   Ordering Provider&#x27;s Pager/Contact #[phone] (06-21 @ 07:23)  Complete Blood Count: AM Sched. Collection: 22-Jun-2017 05:00 (06-21 @ 07:23)  Comprehensive Metabolic Panel: AM Sched. Collection: 22-Jun-2017 05:00 (06-21 @ 07:23)  Diet, Pureed:   Consistent Carbohydrate No Snacks (06-21 @ 09:49)  metoprolol Injectable: [Ordered as LOPRESSOR Injectable]  5 milliGRAM(s), IV Push, once, Stop After 1 Doses  Provider&#x27;s Contact #: [phone] (06-21 @ 19:27) [completed]      Assessment & Opinion:67 y o man with previous cva with secondary left hemiparesis is found to have difficulty expressing himself and recurrent involuntary mvts of the right upper extremity may be experiencing seizure   DX SEIZURE    Recommendations:  Brain MRI.   EEG.   DVT prophylaxis as ordered.pLAN : START ON KEPPRA   Medications:

## 2017-06-22 LAB
ALBUMIN SERPL ELPH-MCNC: 3.5 G/DL — SIGNIFICANT CHANGE UP (ref 3.3–5)
ALP SERPL-CCNC: 99 U/L — SIGNIFICANT CHANGE UP (ref 40–120)
ALT FLD-CCNC: 18 U/L — SIGNIFICANT CHANGE UP (ref 12–78)
ANION GAP SERPL CALC-SCNC: 9 MMOL/L — SIGNIFICANT CHANGE UP (ref 5–17)
AST SERPL-CCNC: 21 U/L — SIGNIFICANT CHANGE UP (ref 15–37)
BILIRUB SERPL-MCNC: 0.7 MG/DL — SIGNIFICANT CHANGE UP (ref 0.2–1.2)
BUN SERPL-MCNC: 20 MG/DL — SIGNIFICANT CHANGE UP (ref 7–23)
CALCIUM SERPL-MCNC: 8.8 MG/DL — SIGNIFICANT CHANGE UP (ref 8.5–10.1)
CHLORIDE SERPL-SCNC: 105 MMOL/L — SIGNIFICANT CHANGE UP (ref 96–108)
CO2 SERPL-SCNC: 26 MMOL/L — SIGNIFICANT CHANGE UP (ref 22–31)
CREAT SERPL-MCNC: 1.15 MG/DL — SIGNIFICANT CHANGE UP (ref 0.5–1.3)
GLUCOSE SERPL-MCNC: 187 MG/DL — HIGH (ref 70–99)
HCT VFR BLD CALC: 37 % — LOW (ref 39–50)
HGB BLD-MCNC: 14.3 G/DL — SIGNIFICANT CHANGE UP (ref 13–17)
MCHC RBC-ENTMCNC: 32 PG — SIGNIFICANT CHANGE UP (ref 27–34)
MCHC RBC-ENTMCNC: 38.6 GM/DL — HIGH (ref 32–36)
MCV RBC AUTO: 82.9 FL — SIGNIFICANT CHANGE UP (ref 80–100)
PLATELET # BLD AUTO: 284 K/UL — SIGNIFICANT CHANGE UP (ref 150–400)
POTASSIUM SERPL-MCNC: 3.9 MMOL/L — SIGNIFICANT CHANGE UP (ref 3.5–5.3)
POTASSIUM SERPL-SCNC: 3.9 MMOL/L — SIGNIFICANT CHANGE UP (ref 3.5–5.3)
PROT SERPL-MCNC: 7.1 GM/DL — SIGNIFICANT CHANGE UP (ref 6–8.3)
RBC # BLD: 4.46 M/UL — SIGNIFICANT CHANGE UP (ref 4.2–5.8)
RBC # FLD: 11.3 % — SIGNIFICANT CHANGE UP (ref 11–15)
SODIUM SERPL-SCNC: 140 MMOL/L — SIGNIFICANT CHANGE UP (ref 135–145)
WBC # BLD: 7.9 K/UL — SIGNIFICANT CHANGE UP (ref 3.8–10.5)
WBC # FLD AUTO: 7.9 K/UL — SIGNIFICANT CHANGE UP (ref 3.8–10.5)

## 2017-06-22 RX ORDER — DIVALPROEX SODIUM 500 MG/1
250 TABLET, DELAYED RELEASE ORAL THREE TIMES A DAY
Qty: 0 | Refills: 0 | Status: DISCONTINUED | OUTPATIENT
Start: 2017-06-22 | End: 2017-06-23

## 2017-06-22 RX ADMIN — Medication 325 MILLIGRAM(S): at 12:00

## 2017-06-22 RX ADMIN — ENOXAPARIN SODIUM 40 MILLIGRAM(S): 100 INJECTION SUBCUTANEOUS at 12:00

## 2017-06-22 RX ADMIN — ATORVASTATIN CALCIUM 40 MILLIGRAM(S): 80 TABLET, FILM COATED ORAL at 22:40

## 2017-06-22 RX ADMIN — DIVALPROEX SODIUM 250 MILLIGRAM(S): 500 TABLET, DELAYED RELEASE ORAL at 22:40

## 2017-06-22 RX ADMIN — AMLODIPINE BESYLATE 10 MILLIGRAM(S): 2.5 TABLET ORAL at 05:45

## 2017-06-22 RX ADMIN — DIVALPROEX SODIUM 250 MILLIGRAM(S): 500 TABLET, DELAYED RELEASE ORAL at 15:24

## 2017-06-22 RX ADMIN — Medication 3: at 11:59

## 2017-06-22 RX ADMIN — Medication 1: at 08:15

## 2017-06-22 NOTE — PROGRESS NOTE ADULT - SUBJECTIVE AND OBJECTIVE BOX
INTERVAL HPI/OVERNIGHT EVENTS:    EEG  unable  to  be  performed  6/21/17    REVIEW OF SYSTEMS:  CONSTITUTIONAL:  patient  alert  comfortable  states feels  better      MEDICATION:  valsartan 320milliGRAM(s) Oral daily  aspirin buffered 325milliGRAM(s) Oral daily  atorvastatin 40milliGRAM(s) Oral at bedtime  enoxaparin Injectable 40milliGRAM(s) SubCutaneous daily  insulin lispro (HumaLOG) corrective regimen sliding scale  SubCutaneous three times a day before meals  insulin lispro (HumaLOG) corrective regimen sliding scale  SubCutaneous at bedtime  dextrose 5%. 1000milliLiter(s) IV Continuous <Continuous>  dextrose Gel 1Dose(s) Oral once PRN  dextrose 50% Injectable 12.5Gram(s) IV Push once  dextrose 50% Injectable 25Gram(s) IV Push once  dextrose 50% Injectable 25Gram(s) IV Push once  glucagon  Injectable 1milliGRAM(s) IntraMuscular once PRN  amLODIPine   Tablet 10milliGRAM(s) Oral daily    Vital Signs Last 24 Hrs  T(C): 36.6, Max: 37 (06-21 @ 17:00)  T(F): 97.8, Max: 98.6 (06-21 @ 17:00)  HR: 85 (85 - 107)  BP: 110/67 (110/67 - 168/93)  BP(mean): --  RR: 16 (16 - 19)  SpO2: 98% (96% - 98%)    PHYSICAL EXAM:  GENERAL: NAD,  EYES:  conjunctiva and sclera clear  ENMT:  Moist mucous membranes,   NECK: Supple, No JVD, Normal thyroid  NERVOUS SYSTEM:  Alert follows commands   L  hemiparesis  CHEST/LUNG: Clear    HEART: Regular rate and rhythm; No murmurs, rubs, or gallops  ABDOMEN: Soft, Nontender, Nondistended; Bowel sounds present  EXTREMITIES:  no  edema no  tenderness  SKIN: No rashes   LABS:                        14.3   7.9   )-----------( 284      ( 22 Jun 2017 06:15 )             37.0     06-22    140  |  105  |  20  ----------------------------<  187<H>  3.9   |  26  |  1.15    Ca    8.8      22 Jun 2017 06:15    TPro  7.1  /  Alb  3.5  /  TBili  0.7  /  DBili  x   /  AST  21  /  ALT  18  /  AlkPhos  99  06-22    PT/INR - ( 21 Jun 2017 07:15 )   PT: 12.3 sec;   INR: 1.13 ratio         PTT - ( 21 Jun 2017 07:15 )  PTT:28.7 sec    CAPILLARY BLOOD GLUCOSE  171 (21 Jun 2017 22:59)  201 (21 Jun 2017 15:53)  207 (21 Jun 2017 08:19)      RADIOLOGY & ADDITIONAL TESTS:    Imaging reports  Personally Reviewed:  [x ] YES  [ ] NO    Consultant(s) Notes Reviewed:  [x ] YES  [ ] NO    Care Discussed with Consultants/Other Providers [ x] YES  [ ] NO  Assessment and Plan:   Problem/Plan - 1:  ·  Problem: CVA (cerebral vascular accident).  Plan: asa lipitor awaiting  EEG add  Keppra  if  +    Problem/Plan - 2:  ·  Problem: Hemiparesis affecting left side as late effect of stroke.  Plan: as  above patient     Problem/Plan - 3:  ·  Problem: Unresponsive episode.  Plan: atypical seizures  Awating  neurology  EEG.     Problem/Plan - 4:  ·  Problem: Diabetes.  Plan: regular  insulin  cooverage.     Problem/Plan - 5:  ·  Problem: HTN (hypertension).  Plan: norvasc  diovan.   D/D ben

## 2017-06-22 NOTE — DIETITIAN INITIAL EVALUATION ADULT. - PERTINENT LABORATORY DATA
06-22 Na140 mmol/L Glu 187 mg/dL<H> K+ 3.9 mmol/L Cr  1.15 mg/dL BUN 20 mg/dL Phos n/a   Alb 3.5 g/dL PAB n/a. 6/20- Chol 136, TG's 216H, HDL 34L, HgbA1c 6.9H.

## 2017-06-22 NOTE — DIETITIAN INITIAL EVALUATION ADULT. - OTHER INFO
68yo M seen for coumadin ed (note not currently on coumadin, order completed 6/20) & consult for HgbA1c= 6.9. Pt s/p CVA c L sided hemiparesis, on pureed diet, not seen by SLP. Pt from [facility], per transfer sheets pt on Low Na NCS Chopped consistency c thin liquids c Glytrol 4 oz daily. Pt was on metformin & gabapentin @ center PTA. Observed 50% of lunch tray consumption @ bedside. Last BM x 3 6/21.

## 2017-06-22 NOTE — PROGRESS NOTE ADULT - SUBJECTIVE AND OBJECTIVE BOX
Subjective Complaints:  Historian: record review        REVIEW OF SYSTEMS:  Eyes:  Good vision, no reported pain  ENT:  No sore throat, pain, runny nose, dysphagia  CV:  No pain, palpitatioins, hypo/hypertension  Resp:  No dyspnea, cough, tachypnea, wheezing  GI:  No pain, nausea, vomiting, diarrhea, constipatiion  Muscle:  No pain, weakness constant ttremors of the right upper extremity  Neuro:   weakness, tingling,pATIENT IS VERY FORGETFUL  Psych:  No fatigue, insomnia, mood problems, depression  Endocrine:  No polyuria, polydypsia, cold/heat intolerance    Vital Signs Last 24 Hrs  T(C): 36.7, Max: 37 (06-21 @ 17:00)  T(F): 98, Max: 98.6 (06-21 @ 17:00)  HR: 79 (79 - 107)  BP: 165/91 (110/67 - 167/105)  BP(mean): --  RR: 17 (16 - 19)  SpO2: 96% (96% - 98%)    GENERAL PHYSICAL EXAM:  General:  Appears stated age, well-groomed, well-nourished, no distress  HEENT:  NC/AT, patent nares w/ pink mucosa, OP clear w/o lesions, PERRL, EOMI, conjunctivae clear, no thyromegaly, nodules, adenopathy, no JVD  Chest:  Full & symmetric excursion, no increased effort, breath sounds clear  Cardiovascular:  Regular rhythm, S1, S2, no murmur/rub/S3/S4, no carotid/femoral/abdominal bruit, radial/pedal pulses 2+, no edema  Abdomen:  Soft, non-tender, non-distended, normoactive bowel sounds, no HSM  Extremities:  Gait & station:   Digits:   Nails:   Joints, Bones, Muscles:   ROM:   Stability:  Skin:  No rash/erythema/ecchymoses/petechiae/wounds/abscess/warm/dry  Musculoskeletal:  Full ROM in all joints w/o swelling/tenderness/effusion    NEUROLOGICAL EXAM:  HENT:  Normocephalic head; atraumatic head.  Neck supple.  ENT: normal looking.  Mental State:    Alert.  Fully oriented to person, place and date.  Coherent.  Speech clear and intact.  Cooperative.  Responds appropriately.    Cranial Nerves:  II-XII:   Pupils round and reactive to light and accommodation.  Extraocular movements full.  Visual fields full (no homonymous hemianopsia).  Visual acuity wnl.  Facial symmetry intact.  Tongue midline.  Motor Functions:  LEFT UPPER EXTREMITY WEAKNESS right upper extremity tremors   Sensory Functions:   Intact to touch and pinprick to face and extremities.    Reflexes:  Deep tendon reflexes normoactive to biceps, knees and ankles.  Babinski absent (present).  Cerebellar Testing:    Finger to nose intact.  Nystagmus absent.  Neurovascular: Carotid auscultation full without bruits.      LABS:                        14.3   7.9   )-----------( 284      ( 22 Jun 2017 06:15 )             37.0     06-22    140  |  105  |  20  ----------------------------<  187<H>  3.9   |  26  |  1.15    Ca    8.8      22 Jun 2017 06:15    TPro  7.1  /  Alb  3.5  /  TBili  0.7  /  DBili  x   /  AST  21  /  ALT  18  /  AlkPhos  99  06-22    PT/INR - ( 21 Jun 2017 07:15 )   PT: 12.3 sec;   INR: 1.13 ratio         PTT - ( 21 Jun 2017 07:15 )  PTT:28.7 sec        RADIOLOGY & ADDITIONAL STUDIES:    metoprolol Injectable: [Ordered as LOPRESSOR Injectable]  5 milliGRAM(s), IV Push, once, Stop After 1 Doses  Provider&#x27;s Contact #: [phone] (06-21 @ 19:27) [completed]  Provider to RN:       Hold AM losartan (06-22 @ 05:39)  DX TREMOR  vs SEIZURE    plan ;many attempts were made to perform an EEG but patient was uncooperativeand he will be statrted on a trial of AEDS AND DISCHARGE PATIENT WILL BE FOLLOWED BY HIS OUTSIDE NEUROLOGIST    Recommendations:  Brain MRI.  Carotid doppler.  Echocardiogram.  EEG.   DVT prophylaxis as ordered.  Medications:

## 2017-06-23 VITALS
TEMPERATURE: 100 F | DIASTOLIC BLOOD PRESSURE: 81 MMHG | RESPIRATION RATE: 20 BRPM | SYSTOLIC BLOOD PRESSURE: 133 MMHG | OXYGEN SATURATION: 98 % | HEART RATE: 113 BPM

## 2017-06-23 RX ORDER — ASPIRIN/CALCIUM CARB/MAGNESIUM 325 MG
325 TABLET ORAL
Qty: 0 | Refills: 0 | DISCHARGE
Start: 2017-06-23

## 2017-06-23 RX ORDER — DIVALPROEX SODIUM 500 MG/1
1 TABLET, DELAYED RELEASE ORAL
Qty: 0 | Refills: 0 | DISCHARGE
Start: 2017-06-23

## 2017-06-23 RX ADMIN — Medication 1: at 08:04

## 2017-06-23 RX ADMIN — Medication 1: at 12:08

## 2017-06-23 RX ADMIN — AMLODIPINE BESYLATE 10 MILLIGRAM(S): 2.5 TABLET ORAL at 06:59

## 2017-06-23 RX ADMIN — ENOXAPARIN SODIUM 40 MILLIGRAM(S): 100 INJECTION SUBCUTANEOUS at 12:09

## 2017-06-23 RX ADMIN — DIVALPROEX SODIUM 250 MILLIGRAM(S): 500 TABLET, DELAYED RELEASE ORAL at 06:59

## 2017-06-23 RX ADMIN — DIVALPROEX SODIUM 250 MILLIGRAM(S): 500 TABLET, DELAYED RELEASE ORAL at 13:34

## 2017-06-23 RX ADMIN — Medication 325 MILLIGRAM(S): at 12:09

## 2017-06-23 RX ADMIN — VALSARTAN 320 MILLIGRAM(S): 80 TABLET ORAL at 06:59

## 2017-06-23 NOTE — DISCHARGE NOTE ADULT - SECONDARY DIAGNOSIS.
Seizure disorder as sequela of cerebrovascular accident CVA (cerebral vascular accident) Diabetes Hemiparesis affecting left side as late effect of stroke HTN (hypertension) Hypercholesteremia

## 2017-06-23 NOTE — DISCHARGE NOTE ADULT - PLAN OF CARE
avoidance  of  recurrence secondary  stroke  prevention avoidance of  recurrence observe  on  valproic  acid

## 2017-06-23 NOTE — PROGRESS NOTE ADULT - SUBJECTIVE AND OBJECTIVE BOX
INTERVAL HPI/OVERNIGHT EVENTS:        REVIEW OF SYSTEMS:  CONSTITUTIONAL:  alert  comfortable        MEDICATION:  valsartan 320milliGRAM(s) Oral daily  aspirin buffered 325milliGRAM(s) Oral daily  atorvastatin 40milliGRAM(s) Oral at bedtime  enoxaparin Injectable 40milliGRAM(s) SubCutaneous daily  insulin lispro (HumaLOG) corrective regimen sliding scale  SubCutaneous three times a day before meals  insulin lispro (HumaLOG) corrective regimen sliding scale  SubCutaneous at bedtime  dextrose 5%. 1000milliLiter(s) IV Continuous <Continuous>  dextrose Gel 1Dose(s) Oral once PRN  dextrose 50% Injectable 12.5Gram(s) IV Push once  dextrose 50% Injectable 25Gram(s) IV Push once  dextrose 50% Injectable 25Gram(s) IV Push once  glucagon  Injectable 1milliGRAM(s) IntraMuscular once PRN  amLODIPine   Tablet 10milliGRAM(s) Oral daily  diVALproex DR 250milliGRAM(s) Oral three times a day    Vital Signs Last 24 Hrs  T(C): 31.7, Max: 37.2 (06-23 @ 00:53)  T(F): 89, Max: 98.9 (06-23 @ 00:53)  HR: 89 (79 - 100)  BP: 173/88 (131/78 - 173/88)  BP(mean): --  RR: 20 (16 - 20)  SpO2: 97% (96% - 100%)    PHYSICAL EXAM:  GENERAL: NAD,  EYES:  conjunctiva and sclera clear  ENMT:  Moist mucous membranes,   NECK: Supple, No JVD, Normal thyroid  NERVOUS SYSTEM:  Alert responds  episodically  L  hemiparesis  CHEST/LUNG: Clear    HEART: Regular rate and rhythm; No murmurs, rubs, or gallops  ABDOMEN: Soft, Nontender, Nondistended; Bowel sounds present  EXTREMITIES:  no  edema no  tenderness  SKIN: No rashes   LABS:                        14.3   7.9   )-----------( 284      ( 22 Jun 2017 06:15 )             37.0     06-22    140  |  105  |  20  ----------------------------<  187<H>  3.9   |  26  |  1.15    Ca    8.8      22 Jun 2017 06:15    TPro  7.1  /  Alb  3.5  /  TBili  0.7  /  DBili  x   /  AST  21  /  ALT  18  /  AlkPhos  99  06-22        CAPILLARY BLOOD GLUCOSE  165 (23 Jun 2017 07:59)  192 (22 Jun 2017 22:16)  150 (22 Jun 2017 17:28)  276 (22 Jun 2017 11:10)      RADIOLOGY & ADDITIONAL TESTS:    Imaging reports  Personally Reviewed:  [x YES  [ ] NO    Consultant(s) Notes Reviewed:  [x ] YES  [ ] NO    Care Discussed with Consultants/Other Providers [x ] YES  [ ] NO  Assessment and Plan:   Problem/Plan - 1:  ·  Problem: CVA (cerebral vascular accident).  Plan: asa lipitor  discussed with neurologist  unable  to do  EEG  at  this  time  will start anti epileptic  therapy  empirically  further  treatment  as per  clinical  course    Problem/Plan - 2:  ·  Problem: Hemiparesis affecting left side as late effect of stroke.  Plan: as  above patient     Problem/Plan - 3:  ·  Problem: Unresponsive episode.  Plan: atypical seizures  Awating  neurology  EEG.     Problem/Plan - 4:  ·  Problem: Diabetes.  Plan: regular  insulin  coverage.     Problem/Plan - 5:  ·  Problem: HTN (hypertension).  Plan: norvasc  diovan.   discharge  to  rehab results  and  treatment  paln discussed  with Pt"s   wife  at  bedside

## 2017-06-23 NOTE — DISCHARGE NOTE ADULT - HOSPITAL COURSE
patient  treated  with  asa  statin  monitored  on  telemetry  had  episodes  of  poor  responsiveness was  started  empirically   on  valproic  acid  unable  to  do  EEg  because  of  pt's  uncooperation  TTE  carotid  doppler  no  significant  findings  MRI  with  multiple  strokes  some described  as  having  been hemorrhagic  patient  discharged  on  increased  asa  statin  plavix  discontinued  discussed  with  pt's  wife  at  bedside  aware  of  poor  long  term  prognosis  given  the  recurrence  of  CVAs  in spite good of  secondary  prevention  therapy  and  response

## 2017-06-23 NOTE — DISCHARGE NOTE ADULT - CARE PROVIDER_API CALL
Dion Valenzuela), [facility]  [address] [place] [address]  Phone: [phone]  Fax: [phone]

## 2017-06-23 NOTE — DISCHARGE NOTE ADULT - CARE PLAN
Principal Discharge DX:	Unresponsive episode  Goal:	avoidance  of  recurrence  Instructions for follow-up, activity and diet:	secondary  stroke  prevention  Secondary Diagnosis:	Seizure disorder as sequela of cerebrovascular accident  Goal:	avoidance of  recurrence  Instructions for follow-up, activity and diet:	observe  on  valproic  acid  Secondary Diagnosis:	CVA (cerebral vascular accident)  Secondary Diagnosis:	Diabetes  Secondary Diagnosis:	Hemiparesis affecting left side as late effect of stroke  Secondary Diagnosis:	HTN (hypertension)  Secondary Diagnosis:	Hypercholesteremia

## 2017-06-23 NOTE — DISCHARGE NOTE ADULT - MEDICATION SUMMARY - MEDICATIONS TO STOP TAKING
I will STOP taking the medications listed below when I get home from the hospital:    Aspir 81 81 mg oral tablet  -- 1 tab(s) by mouth once a day    docusate sodium 100 mg oral capsule  -- 1 cap(s) by mouth 2 times a day    Plavix 75 mg oral tablet  -- 1 tab(s) by mouth once a day    sertraline 25 mg oral tablet  -- 1 tab(s) by mouth once a day    gabapentin 300 mg oral tablet  --  by mouth

## 2017-06-23 NOTE — DISCHARGE NOTE ADULT - MEDICATION SUMMARY - MEDICATIONS TO TAKE
I will START or STAY ON the medications listed below when I get home from the hospital:    aspirin buffered  -- 325 milligram(s) by mouth once a day  -- Indication: For CVA (cerebral vascular accident)    acetaminophen 325 mg oral tablet  -- 2 tab(s) by mouth every 6 hours, As needed, Mild Pain  -- Indication: For pain    valsartan 320 mg oral tablet  -- 1 tab(s) by mouth once a day  -- Indication: For HTN (hypertension)    divalproex sodium 250 mg oral delayed release tablet  -- 1 tab(s) by mouth 3 times a day  -- Indication: For seizure    metFORMIN 500 mg oral tablet  -- 1 tab(s) by mouth 2 times a day  -- Indication: For Diabetes    atorvastatin 20 mg oral tablet  -- 1 tab(s) by mouth once a day (at bedtime)  -- Indication: For Diabetes cva    amLODIPine 10 mg oral tablet  -- 1 tab(s) by mouth once a day  -- Indication: For HTN (hypertension)    Senna Lax  --  by mouth   -- Indication: For Constipation    senna oral tablet  -- 2 tab(s) by mouth once a day (at bedtime)  -- Indication: For Duplicate  order    omeprazole 20 mg oral delayed release capsule  -- 1 cap(s) by mouth once a day  -- Indication: For gerd

## 2017-06-23 NOTE — DISCHARGE NOTE ADULT - PATIENT PORTAL LINK FT
“You can access the [facility] Patient Portal, offered by [facility], by registering with the following website: [email or web address]”

## 2017-06-25 LAB
CULTURE RESULTS: SIGNIFICANT CHANGE UP
CULTURE RESULTS: SIGNIFICANT CHANGE UP
SPECIMEN SOURCE: SIGNIFICANT CHANGE UP
SPECIMEN SOURCE: SIGNIFICANT CHANGE UP

## 2017-06-26 DIAGNOSIS — Z53.8 PROCEDURE AND TREATMENT NOT CARRIED OUT FOR OTHER REASONS: ICD-10-CM

## 2017-06-26 DIAGNOSIS — I10 ESSENTIAL (PRIMARY) HYPERTENSION: ICD-10-CM

## 2017-06-26 DIAGNOSIS — R53.1 WEAKNESS: ICD-10-CM

## 2017-06-26 DIAGNOSIS — R56.9 UNSPECIFIED CONVULSIONS: ICD-10-CM

## 2017-06-26 DIAGNOSIS — Z79.02 LONG TERM (CURRENT) USE OF ANTITHROMBOTICS/ANTIPLATELETS: ICD-10-CM

## 2017-06-26 DIAGNOSIS — E11.9 TYPE 2 DIABETES MELLITUS WITHOUT COMPLICATIONS: ICD-10-CM

## 2017-06-26 DIAGNOSIS — R25.1 TREMOR, UNSPECIFIED: ICD-10-CM

## 2017-06-26 DIAGNOSIS — E78.00 PURE HYPERCHOLESTEROLEMIA, UNSPECIFIED: ICD-10-CM

## 2017-06-26 DIAGNOSIS — I69.354 HEMIPLEGIA AND HEMIPARESIS FOLLOWING CEREBRAL INFARCTION AFFECTING LEFT NON-DOMINANT SIDE: ICD-10-CM

## 2017-06-26 DIAGNOSIS — Z79.84 LONG TERM (CURRENT) USE OF ORAL HYPOGLYCEMIC DRUGS: ICD-10-CM

## 2017-06-26 DIAGNOSIS — Z79.82 LONG TERM (CURRENT) USE OF ASPIRIN: ICD-10-CM

## 2017-06-26 DIAGNOSIS — G31.84 MILD COGNITIVE IMPAIRMENT OF UNCERTAIN OR UNKNOWN ETIOLOGY: ICD-10-CM

## 2019-06-09 ENCOUNTER — INPATIENT (INPATIENT)
Facility: HOSPITAL | Age: 69
LOS: 8 days | Discharge: HOSPICE | End: 2019-06-18
Attending: INTERNAL MEDICINE | Admitting: INTERNAL MEDICINE
Payer: MEDICARE

## 2019-06-09 VITALS
OXYGEN SATURATION: 96 % | DIASTOLIC BLOOD PRESSURE: 111 MMHG | HEIGHT: 73 IN | TEMPERATURE: 98 F | RESPIRATION RATE: 19 BRPM | SYSTOLIC BLOOD PRESSURE: 192 MMHG | HEART RATE: 108 BPM | WEIGHT: 130.07 LBS

## 2019-06-09 DIAGNOSIS — Z11.59 ENCOUNTER FOR SCREENING FOR OTHER VIRAL DISEASES: ICD-10-CM

## 2019-06-09 DIAGNOSIS — I63.9 CEREBRAL INFARCTION, UNSPECIFIED: ICD-10-CM

## 2019-06-09 DIAGNOSIS — I69.354 HEMIPLEGIA AND HEMIPARESIS FOLLOWING CEREBRAL INFARCTION AFFECTING LEFT NON-DOMINANT SIDE: ICD-10-CM

## 2019-06-09 DIAGNOSIS — I10 ESSENTIAL (PRIMARY) HYPERTENSION: ICD-10-CM

## 2019-06-09 DIAGNOSIS — I50.32 CHRONIC DIASTOLIC (CONGESTIVE) HEART FAILURE: ICD-10-CM

## 2019-06-09 DIAGNOSIS — Z86.73 PERSONAL HISTORY OF TRANSIENT ISCHEMIC ATTACK (TIA), AND CEREBRAL INFARCTION WITHOUT RESIDUAL DEFICITS: ICD-10-CM

## 2019-06-09 DIAGNOSIS — E78.00 PURE HYPERCHOLESTEROLEMIA, UNSPECIFIED: ICD-10-CM

## 2019-06-09 DIAGNOSIS — E11.9 TYPE 2 DIABETES MELLITUS WITHOUT COMPLICATIONS: ICD-10-CM

## 2019-06-09 DIAGNOSIS — E43 UNSPECIFIED SEVERE PROTEIN-CALORIE MALNUTRITION: ICD-10-CM

## 2019-06-09 LAB
ALBUMIN SERPL ELPH-MCNC: 3.8 G/DL — SIGNIFICANT CHANGE UP (ref 3.3–5)
ALP SERPL-CCNC: 138 U/L — HIGH (ref 40–120)
ALT FLD-CCNC: 16 U/L — SIGNIFICANT CHANGE UP (ref 12–78)
ANION GAP SERPL CALC-SCNC: 10 MMOL/L — SIGNIFICANT CHANGE UP (ref 5–17)
APPEARANCE UR: CLEAR — SIGNIFICANT CHANGE UP
APTT BLD: 28.4 SEC — SIGNIFICANT CHANGE UP (ref 27.5–36.3)
AST SERPL-CCNC: 20 U/L — SIGNIFICANT CHANGE UP (ref 15–37)
BILIRUB SERPL-MCNC: 1.1 MG/DL — SIGNIFICANT CHANGE UP (ref 0.2–1.2)
BILIRUB UR-MCNC: NEGATIVE — SIGNIFICANT CHANGE UP
BUN SERPL-MCNC: 23 MG/DL — SIGNIFICANT CHANGE UP (ref 7–23)
CALCIUM SERPL-MCNC: 9.1 MG/DL — SIGNIFICANT CHANGE UP (ref 8.5–10.1)
CHLORIDE SERPL-SCNC: 104 MMOL/L — SIGNIFICANT CHANGE UP (ref 96–108)
CO2 SERPL-SCNC: 25 MMOL/L — SIGNIFICANT CHANGE UP (ref 22–31)
COLOR SPEC: YELLOW — SIGNIFICANT CHANGE UP
CREAT SERPL-MCNC: 1.04 MG/DL — SIGNIFICANT CHANGE UP (ref 0.5–1.3)
DIFF PNL FLD: NEGATIVE — SIGNIFICANT CHANGE UP
GLUCOSE BLDC GLUCOMTR-MCNC: 209 MG/DL — HIGH (ref 70–99)
GLUCOSE BLDC GLUCOMTR-MCNC: 261 MG/DL — HIGH (ref 70–99)
GLUCOSE SERPL-MCNC: 235 MG/DL — HIGH (ref 70–99)
GLUCOSE UR QL: 250 MG/DL
HCT VFR BLD CALC: 40.3 % — SIGNIFICANT CHANGE UP (ref 39–50)
HGB BLD-MCNC: 13.8 G/DL — SIGNIFICANT CHANGE UP (ref 13–17)
INR BLD: 1.16 RATIO — SIGNIFICANT CHANGE UP (ref 0.88–1.16)
KETONES UR-MCNC: NEGATIVE — SIGNIFICANT CHANGE UP
LEUKOCYTE ESTERASE UR-ACNC: NEGATIVE — SIGNIFICANT CHANGE UP
LIDOCAIN IGE QN: 194 U/L — SIGNIFICANT CHANGE UP (ref 73–393)
MCHC RBC-ENTMCNC: 29.3 PG — SIGNIFICANT CHANGE UP (ref 27–34)
MCHC RBC-ENTMCNC: 34.2 GM/DL — SIGNIFICANT CHANGE UP (ref 32–36)
MCV RBC AUTO: 85.6 FL — SIGNIFICANT CHANGE UP (ref 80–100)
NITRITE UR-MCNC: NEGATIVE — SIGNIFICANT CHANGE UP
NRBC # BLD: 0 /100 WBCS — SIGNIFICANT CHANGE UP (ref 0–0)
PH UR: 6.5 — SIGNIFICANT CHANGE UP (ref 5–8)
PLATELET # BLD AUTO: 361 K/UL — SIGNIFICANT CHANGE UP (ref 150–400)
POTASSIUM SERPL-MCNC: 3.8 MMOL/L — SIGNIFICANT CHANGE UP (ref 3.5–5.3)
POTASSIUM SERPL-SCNC: 3.8 MMOL/L — SIGNIFICANT CHANGE UP (ref 3.5–5.3)
PROT SERPL-MCNC: 8.2 GM/DL — SIGNIFICANT CHANGE UP (ref 6–8.3)
PROT UR-MCNC: 15 MG/DL
PROTHROM AB SERPL-ACNC: 13 SEC — HIGH (ref 10–12.9)
RBC # BLD: 4.71 M/UL — SIGNIFICANT CHANGE UP (ref 4.2–5.8)
RBC # FLD: 12.7 % — SIGNIFICANT CHANGE UP (ref 10.3–14.5)
RBC CASTS # UR COMP ASSIST: NEGATIVE /HPF — SIGNIFICANT CHANGE UP (ref 0–4)
SODIUM SERPL-SCNC: 139 MMOL/L — SIGNIFICANT CHANGE UP (ref 135–145)
SP GR SPEC: 1.01 — SIGNIFICANT CHANGE UP (ref 1.01–1.02)
TROPONIN I SERPL-MCNC: <.015 NG/ML — SIGNIFICANT CHANGE UP (ref 0.01–0.04)
UROBILINOGEN FLD QL: NEGATIVE MG/DL — SIGNIFICANT CHANGE UP
WBC # BLD: 6.85 K/UL — SIGNIFICANT CHANGE UP (ref 3.8–10.5)
WBC # FLD AUTO: 6.85 K/UL — SIGNIFICANT CHANGE UP (ref 3.8–10.5)
WBC UR QL: NEGATIVE — SIGNIFICANT CHANGE UP

## 2019-06-09 PROCEDURE — 70450 CT HEAD/BRAIN W/O DYE: CPT | Mod: 26

## 2019-06-09 PROCEDURE — 99223 1ST HOSP IP/OBS HIGH 75: CPT

## 2019-06-09 PROCEDURE — 71045 X-RAY EXAM CHEST 1 VIEW: CPT | Mod: 26

## 2019-06-09 PROCEDURE — 99285 EMERGENCY DEPT VISIT HI MDM: CPT

## 2019-06-09 PROCEDURE — 93010 ELECTROCARDIOGRAM REPORT: CPT

## 2019-06-09 RX ORDER — HYDRALAZINE HCL 50 MG
10 TABLET ORAL EVERY 4 HOURS
Refills: 0 | Status: DISCONTINUED | OUTPATIENT
Start: 2019-06-09 | End: 2019-06-18

## 2019-06-09 RX ORDER — PANTOPRAZOLE SODIUM 20 MG/1
40 TABLET, DELAYED RELEASE ORAL
Refills: 0 | Status: DISCONTINUED | OUTPATIENT
Start: 2019-06-09 | End: 2019-06-11

## 2019-06-09 RX ORDER — DOCUSATE SODIUM 100 MG
100 CAPSULE ORAL
Refills: 0 | Status: DISCONTINUED | OUTPATIENT
Start: 2019-06-09 | End: 2019-06-18

## 2019-06-09 RX ORDER — SODIUM CHLORIDE 9 MG/ML
1000 INJECTION, SOLUTION INTRAVENOUS
Refills: 0 | Status: DISCONTINUED | OUTPATIENT
Start: 2019-06-09 | End: 2019-06-18

## 2019-06-09 RX ORDER — DIVALPROEX SODIUM 500 MG/1
250 TABLET, DELAYED RELEASE ORAL THREE TIMES A DAY
Refills: 0 | Status: DISCONTINUED | OUTPATIENT
Start: 2019-06-09 | End: 2019-06-10

## 2019-06-09 RX ORDER — SIMVASTATIN 20 MG/1
40 TABLET, FILM COATED ORAL AT BEDTIME
Refills: 0 | Status: DISCONTINUED | OUTPATIENT
Start: 2019-06-09 | End: 2019-06-18

## 2019-06-09 RX ORDER — DEXTROSE 50 % IN WATER 50 %
12.5 SYRINGE (ML) INTRAVENOUS ONCE
Refills: 0 | Status: DISCONTINUED | OUTPATIENT
Start: 2019-06-09 | End: 2019-06-18

## 2019-06-09 RX ORDER — ATORVASTATIN CALCIUM 80 MG/1
20 TABLET, FILM COATED ORAL AT BEDTIME
Refills: 0 | Status: DISCONTINUED | OUTPATIENT
Start: 2019-06-09 | End: 2019-06-09

## 2019-06-09 RX ORDER — GLUCAGON INJECTION, SOLUTION 0.5 MG/.1ML
1 INJECTION, SOLUTION SUBCUTANEOUS ONCE
Refills: 0 | Status: DISCONTINUED | OUTPATIENT
Start: 2019-06-09 | End: 2019-06-18

## 2019-06-09 RX ORDER — METOPROLOL TARTRATE 50 MG
5 TABLET ORAL EVERY 6 HOURS
Refills: 0 | Status: DISCONTINUED | OUTPATIENT
Start: 2019-06-09 | End: 2019-06-18

## 2019-06-09 RX ORDER — LABETALOL HCL 100 MG
10 TABLET ORAL ONCE
Refills: 0 | Status: COMPLETED | OUTPATIENT
Start: 2019-06-09 | End: 2019-06-09

## 2019-06-09 RX ORDER — SENNA PLUS 8.6 MG/1
2 TABLET ORAL AT BEDTIME
Refills: 0 | Status: DISCONTINUED | OUTPATIENT
Start: 2019-06-09 | End: 2019-06-18

## 2019-06-09 RX ORDER — LOSARTAN POTASSIUM 100 MG/1
100 TABLET, FILM COATED ORAL DAILY
Refills: 0 | Status: DISCONTINUED | OUTPATIENT
Start: 2019-06-09 | End: 2019-06-18

## 2019-06-09 RX ORDER — ASPIRIN/CALCIUM CARB/MAGNESIUM 324 MG
81 TABLET ORAL DAILY
Refills: 0 | Status: DISCONTINUED | OUTPATIENT
Start: 2019-06-09 | End: 2019-06-18

## 2019-06-09 RX ORDER — POLYETHYLENE GLYCOL 3350 17 G/17G
17 POWDER, FOR SOLUTION ORAL DAILY
Refills: 0 | Status: DISCONTINUED | OUTPATIENT
Start: 2019-06-09 | End: 2019-06-18

## 2019-06-09 RX ORDER — AMLODIPINE BESYLATE 2.5 MG/1
10 TABLET ORAL DAILY
Refills: 0 | Status: DISCONTINUED | OUTPATIENT
Start: 2019-06-09 | End: 2019-06-18

## 2019-06-09 RX ORDER — DEXTROSE 50 % IN WATER 50 %
15 SYRINGE (ML) INTRAVENOUS ONCE
Refills: 0 | Status: DISCONTINUED | OUTPATIENT
Start: 2019-06-09 | End: 2019-06-18

## 2019-06-09 RX ORDER — ENOXAPARIN SODIUM 100 MG/ML
40 INJECTION SUBCUTANEOUS EVERY 24 HOURS
Refills: 0 | Status: DISCONTINUED | OUTPATIENT
Start: 2019-06-09 | End: 2019-06-18

## 2019-06-09 RX ORDER — INSULIN LISPRO 100/ML
VIAL (ML) SUBCUTANEOUS
Refills: 0 | Status: DISCONTINUED | OUTPATIENT
Start: 2019-06-09 | End: 2019-06-10

## 2019-06-09 RX ADMIN — Medication 10 MILLIGRAM(S): at 16:20

## 2019-06-09 RX ADMIN — Medication 10 MILLIGRAM(S): at 20:01

## 2019-06-09 RX ADMIN — ENOXAPARIN SODIUM 40 MILLIGRAM(S): 100 INJECTION SUBCUTANEOUS at 20:02

## 2019-06-09 NOTE — ED PROVIDER NOTE - OBJECTIVE STATEMENT
Pt is a 70 yo gentleman with a pmhx of HTN, NIDM, CVA x 2 who presents to the ED with weakness, and new L. side facial droop starting 2 days ago. No change in mental status otherwise, but does have decreased strength, and is not taking anything orally anymore which is off baseline. No complaints of any pain, no n/v/d. No fevers. Has had increased urinary frequency. No foul odor.

## 2019-06-09 NOTE — H&P ADULT - ATTENDING COMMENTS
GENERAL: NAD, well-groomed, well-developed  HEAD:  Atraumatic, Normocephalic  EYES: conjunctiva and sclera clear  ENMT; Moist mucous membranes  CHEST/LUNG: Clear to ascultation bilaterally; No rales, rhonchi, wheezing, or rubs  HEART: Regular rate and rhythm; No murmurs, rubs, or gallops  ABDOMEN: Soft, Nontender, mildly distended; Bowel sounds present  EXTREMITIES:  contracted left UE, 2+ Peripheral Pulses, No clubbing, cyanosis, or edema  NERVOUS SYSTEM:  Awake, non-verbal, does not follow commands, resting tremor on right UE, unable to examine remaining neuro exam

## 2019-06-09 NOTE — ED PROVIDER NOTE - PROGRESS NOTE DETAILS
Pt mental status stable. Straight cath unable to provide urine sample, Dr. Valenzuela and Dr. Arciniega consulted for admission.

## 2019-06-09 NOTE — ED ADULT TRIAGE NOTE - CHIEF COMPLAINT QUOTE
patient BIBA , as per wife Kalani , patient  is not talking and has facial droop for 2 days , patient had a stroke x2 , he has L side residue from the previos one , as per wife facial droop is since 2 daysa go

## 2019-06-09 NOTE — H&P ADULT - ASSESSMENT
Assessment: L-sided weakness, r/o CVA (out of window for TPA)    PLAN:  - Admit to unit: Telemetry  - ASA, statin  - Continue home meds  - Diet: Puree w/ nectar thick  - Services:    [Speech/OT/PT]  - EKG, MRI brain, TTE, carotid doppler  - CBC, BMP, UA, urine Cx  - Dr. Arciniega (neurology) called by ED  - Consider cardiology consult  - VTE PPx: Lovenox sq  - GI PPx: Protonix  - Discussed with Dr. Quinteros aware and agrees with the plan  - Will continue to follow up    Time spent on encounter 90 minutes. Assessment: L-sided weakness, r/o CVA (out of window for TPA)    PLAN:  - Admit to unit: Telemetry  - ASA, statin  - Continue home meds  - Diet: Puree w/ nectar thick  - Services: S&S, PT, OT, nutrition  - EKG, MRI brain, TTE, carotid doppler  - CBC, BMP, UA, urine Cx  - Dr. Arciniega (neurology) called by ED MD  - Consider cardiology consult  - VTE PPx: Lovenox sq  - GI PPx: Protonix  - Discussed with Dr. Quinteros aware and agrees with the plan  - Will continue to follow up    Time spent on encounter 90 minutes. Patient is a 68 y/o M w/ PMH HTN, DM, HLD, CVA x2 presenting to ED w/ L-sided facial droop, decreased speech, decreased appetite x 2 days. Patient will require admission for at least 2 midnights as detailed below:    IMPROVE VTE Individual Risk Assessment          RISK                                                          Points    [  ] Previous VTE                                                3    [  ] Thrombophilia                                             2    [  ] Lower limb paralysis                                   2        (unable to hold up >15 seconds)      [  ] Current Cancer                                            2         (within 6 months)    [  ] Immobilization > 24 hrs                             1    [  ] ICU/CCU stay > 24 hours                           1    [  ] Age > 60                                                       1    IMPROVE VTE Score: 4    PLAN:  - Admit to unit: Telemetry  - ASA, statin  - Continue home meds  - Diet: Puree w/ nectar thick  - Services: S&S, PT, OT, nutrition  - EKG, MRI brain, TTE, carotid doppler  - CBC, BMP, UA, urine Cx  - Dr. Arciniega (neurology) called by ED MD  - Consider cardiology consult  - VTE PPx: Lovenox sq  - GI PPx: Protonix  - Discussed with Dr. Quinteros aware and agrees with the plan  - Will continue to follow up    Time spent on encounter 90 minutes.

## 2019-06-09 NOTE — ED PROVIDER NOTE - CLINICAL SUMMARY MEDICAL DECISION MAKING FREE TEXT BOX
Ddx: ro CVA, NIH SS 1 from baseline w L. facial droop that is new per wife/ occult infection/ failure to thrive  Plan: Ct head, cbc, cmp, lipase, ua, ucx, neuro consult, reassess

## 2019-06-09 NOTE — ED ADULT NURSE REASSESSMENT NOTE - NS ED NURSE REASSESS COMMENT FT1
Patient lying comfortably on stretcher. No acute signs of distress. Patient wife by the bedside. Handoff given to RN Isael.

## 2019-06-09 NOTE — ED ADULT NURSE NOTE - NSIMPLEMENTINTERV_GEN_ALL_ED
Implemented All Fall with Harm Risk Interventions:  [place] to call system. Call bell, personal items and telephone within reach. Instruct patient to call for assistance. Room bathroom lighting operational. Non-slip footwear when patient is off stretcher. Physically safe environment: no spills, clutter or unnecessary equipment. Stretcher in lowest position, wheels locked, appropriate side rails in place. Provide visual cue, wrist band, yellow gown, etc. Monitor gait and stability. Monitor for mental status changes and reorient to person, place, and time. Review medications for side effects contributing to fall risk. Reinforce activity limits and safety measures with patient and family. Provide visual clues: red socks.

## 2019-06-09 NOTE — H&P ADULT - NSICDXPASTMEDICALHX_GEN_ALL_CORE_FT
PAST MEDICAL HISTORY:  CVA (cerebral vascular accident)     Diabetes     Hemiparesis affecting left side as late effect of stroke     HTN (hypertension)     Hypercholesteremia

## 2019-06-09 NOTE — H&P ADULT - HISTORY OF PRESENT ILLNESS
Patient admitted to Dr. Valenzuela. Unable to see patient within 3 hours. H&P and initial orders by hospitalist team. Patient to be followed up for continuity of care with Dr. Valenzuela in 24h.  HPI: Patient is a 70 y/o M w/ PMH HTN, DM, HLD, CVA x2 presenting to ED w/ L-sided facial droop, decreased speech, decreased appetite x 2 days. Per patient's wife patient's baseline mental status is minimally verbal with decreased use of L-side. Wife states patient had been sleeping more lately since starting Trazodone and not been able to give his other medications as prescribed due to increased somnolence.    In ED CT head done - negative for acute hemorrhagic stroke, chronic ischemic changes. Dr. Arciniega called. Patient admitted to Dr. Valenzuela. Unable to see patient within 3 hours. H&P and initial orders by hospitalist team. Patient to be followed up for continuity of care with Dr. Valenzuela in 24h.    HPI: Patient is a 68 y/o M w/ PMH HTN, DM, HLD, CVA x2 presenting to ED w/ L-sided facial droop, decreased speech, decreased appetite x 2 days. Per patient's wife patient's baseline mental status is minimally verbal with decreased use of L-side. Wife states patient had been sleeping more lately since starting Trazodone and not been able to give his other medications as prescribed due to increased somnolence.    In ED CT head done - negative for acute hemorrhagic stroke, chronic ischemic changes. Dr. Arciniega called.

## 2019-06-09 NOTE — CONSULT NOTE ADULT - ASSESSMENT
Subjective Complaints:      Consult requested by ER doctor:                  Attending:     History of Present Illness:  Chief Complaint/Reason for Admission:  History of Present Illness:  HPI:  Patient admitted to Dr. Valenzuela. Unable to see patient within 3 hours. H&P and initial orders by hospitalist team. Patient to be followed up for continuity of care with Dr. Valenzuela in 24h.    HPI: Patient is a 68 y/o M w/ PMH HTN, DM, HLD, CVA x2 presenting to ED w/ L-sided facial droop, decreased speech, decreased appetite x 2 days. Per patient's wife patient's baseline mental status is minimally verbal with decreased use of L-side. Wife states patient had been sleeping more lately since starting Trazodone and not been able to give his other medications as prescribed due to increased somnolence.    In ED CT head done - negative for acute hemorrhagic stroke, chronic ischemic changes. Dr. Arciniega called. (2019 16:36)        PAST MEDICAL & SURGICAL HISTORY:  Hemiparesis affecting left side as late effect of stroke  Hypercholesteremia  Diabetes  CVA (cerebral vascular accident)  HTN (hypertension)  No significant past surgical history  69yMale    MEDICATIONS  (STANDING):  amLODIPine   Tablet 10 milliGRAM(s) Oral daily  aspirin enteric coated 81 milliGRAM(s) Oral daily  dextrose 5%. 1000 milliLiter(s) (50 mL/Hr) IV Continuous <Continuous>  dextrose 50% Injectable 12.5 Gram(s) IV Push once  diVALproex  milliGRAM(s) Oral three times a day  docusate sodium Liquid 100 milliGRAM(s) Oral two times a day  enoxaparin Injectable 40 milliGRAM(s) SubCutaneous every 24 hours  insulin lispro (HumaLOG) corrective regimen sliding scale   SubCutaneous three times a day before meals  losartan 100 milliGRAM(s) Oral daily  pantoprazole    Tablet 40 milliGRAM(s) Oral before breakfast  senna 2 Tablet(s) Oral at bedtime  simvastatin 40 milliGRAM(s) Oral at bedtime    MEDICATIONS  (PRN):  dextrose 40% Gel 15 Gram(s) Oral once PRN Blood Glucose LESS THAN 70 milliGRAM(s)/deciliter  glucagon  Injectable 1 milliGRAM(s) IntraMuscular once PRN Glucose LESS THAN 70 milligrams/deciliter  polyethylene glycol 3350 17 Gram(s) Oral daily PRN Constipation      Allergies    No Known Allergies    Intolerances      FAMILY HISTORY:  No pertinent family history in first degree relatives      REVIEW OF SYSTEMS:  General:  No wt loss, fevers, chills, night sweats  Eyes:  Good vision, no reported pain  ENT:  No sore throat, pain, runny nose, dysphagia  CV:  No pain, palpitatioins, hypo/hypertension  Resp:  No dyspnea, cough, tachypnea, wheezing  GI:  No pain, nausea, vomiting, diarrhea, constipatiion  :  No pain, bleeding, incontinence, nocturia  Muscle:  No pain, weakness  Breast:  No pain, abscess, mass, discharge  Neuro:  No weakness, tingling, memory problems  Psych:  No fatigue, insomnia, mood problems, depression  Endocrine:  No polyuria, polydypsia, cold/heat intolerance  Heme:  No petechiae, ecchymosis, easy bruisability  Skin:  No rash, tattoos, scars, edema      Vital Signs Last 24 Hrs  T(C): 36.7 (2019 19:10), Max: 37.6 (2019 12:16)  T(F): 98 (2019 19:10), Max: 99.7 (2019 12:16)  HR: 81 (2019 19:10) (81 - 108)  BP: 187/112 (2019 19:11) (127/78 - 192/111)  BP(mean): --  RR: 17 (2019 19:10) (17 - 20)  SpO2: 98% (2019 19:10) (96% - 98%)    GENERAL PHYSICAL EXAM:  General:  Appears stated age, well-groomed, well-nourished, no distress  HEENT:  NC/AT, patent nares w/ pink mucosa, OP clear w/o lesions, PERRL, EOMI, conjunctivae clear, no thyromegaly, nodules, adenopathy, no JVD  Chest:  Full & symmetric excursion, no increased effort, breath sounds clear  Cardiovascular:  Regular rhythm, S1, S2, no murmur/rub/S3/S4, no carotid/femoral/abdominal bruit, radial/pedal pulses 2+, no edema  Abdomen:  Soft, non-tender, non-distended, normoactive bowel sounds, no HSM  Extremities:  Gait & station:   Digits:   Nails:   Joints, Bones, Muscles:   ROM:   Stability:  Skin:  No rash/erythema/ecchymoses/petechiae/wounds/abscess/warm/dry  Musculoskeletal:  Full ROM in all joints w/o swelling/tenderness/effusion    NEUROLOGICAL EXAM:  HENT:  Normocephalic head; atraumatic head.  Neck supple.  ENT: normal looking.  Mental State:    Alert.   awake open eyes   does not follws commands old r cva left spastic hemepresis old  left cva r arm 3/5  sesniry intact mute face equal pupils reacts  to light sesniory intact to  pain  discuss iwth wife at bed side toes down  no seziure not  eating cahnge of mental status  for work up, :                        13.8   6.85  )-----------( 361      ( 2019 13:03 )             40.3         139  |  104  |  23  ----------------------------<  235<H>  3.8   |  25  |  1.04    Ca    9.1      2019 13:03    TPro  8.2  /  Alb  3.8  /  TBili  1.1  /  DBili  x   /  AST  20  /  ALT  16  /  AlkPhos  138<H>  06-09    PT/INR - ( 2019 13:03 )   PT: 13.0 sec;   INR: 1.16 ratio         PTT - ( 2019 13:03 )  PTT:28.4 sec    Urinalysis Basic - ( 2019 17:48 )    Color: Yellow / Appearance: Clear / S.015 / pH: x  Gluc: x / Ketone: Negative  / Bili: Negative / Urobili: Negative mg/dL   Blood: x / Protein: 15 mg/dL / Nitrite: Negative   Leuk Esterase: Negative / RBC: Negative /HPF / WBC Negative   Sq Epi: x / Non Sq Epi: x / Bacteria: x        RADIOLOGY & ADDITIONAL STUDIES:      Assessment & Opinion: events    note old cva clarice left spastic hemeprsis mute not eating  cahnge of mental status  ct head noted no new cva  discuss iwth wife for mru daniela  echo doppler asa 81 mg eeg tsh b12 will dollow     Recommendations:  Brain MRI.  Carotid doppler.  Echocardiogram.  EEG.   DVT prophylaxis as ordered.  Medications:

## 2019-06-09 NOTE — ED ADULT NURSE NOTE - OBJECTIVE STATEMENT
Patient presents with wife. No signs of acute distress. Patient presents with wife. No signs of acute distress. Perwife, patient takes pills crushed and eats a Pureed diet.

## 2019-06-10 LAB
ANION GAP SERPL CALC-SCNC: 9 MMOL/L — SIGNIFICANT CHANGE UP (ref 5–17)
BUN SERPL-MCNC: 25 MG/DL — HIGH (ref 7–23)
CALCIUM SERPL-MCNC: 9.2 MG/DL — SIGNIFICANT CHANGE UP (ref 8.5–10.1)
CHLORIDE SERPL-SCNC: 104 MMOL/L — SIGNIFICANT CHANGE UP (ref 96–108)
CHOLEST SERPL-MCNC: 147 MG/DL — SIGNIFICANT CHANGE UP (ref 10–199)
CO2 SERPL-SCNC: 27 MMOL/L — SIGNIFICANT CHANGE UP (ref 22–31)
CREAT SERPL-MCNC: 1 MG/DL — SIGNIFICANT CHANGE UP (ref 0.5–1.3)
CULTURE RESULTS: NO GROWTH — SIGNIFICANT CHANGE UP
GLUCOSE BLDC GLUCOMTR-MCNC: 133 MG/DL — HIGH (ref 70–99)
GLUCOSE BLDC GLUCOMTR-MCNC: 199 MG/DL — HIGH (ref 70–99)
GLUCOSE BLDC GLUCOMTR-MCNC: 209 MG/DL — HIGH (ref 70–99)
GLUCOSE BLDC GLUCOMTR-MCNC: 234 MG/DL — HIGH (ref 70–99)
GLUCOSE SERPL-MCNC: 228 MG/DL — HIGH (ref 70–99)
HBA1C BLD-MCNC: 6.8 % — HIGH (ref 4–5.6)
HCT VFR BLD CALC: 41.2 % — SIGNIFICANT CHANGE UP (ref 39–50)
HCV AB S/CO SERPL IA: 1.22 S/CO — HIGH (ref 0–0.99)
HCV AB SERPL-IMP: ABNORMAL
HDLC SERPL-MCNC: 33 MG/DL — LOW
HGB BLD-MCNC: 13.8 G/DL — SIGNIFICANT CHANGE UP (ref 13–17)
LIPID PNL WITH DIRECT LDL SERPL: 79 MG/DL — SIGNIFICANT CHANGE UP
MCHC RBC-ENTMCNC: 29.4 PG — SIGNIFICANT CHANGE UP (ref 27–34)
MCHC RBC-ENTMCNC: 33.5 GM/DL — SIGNIFICANT CHANGE UP (ref 32–36)
MCV RBC AUTO: 87.7 FL — SIGNIFICANT CHANGE UP (ref 80–100)
NRBC # BLD: 0 /100 WBCS — SIGNIFICANT CHANGE UP (ref 0–0)
PLATELET # BLD AUTO: 415 K/UL — HIGH (ref 150–400)
POTASSIUM SERPL-MCNC: 3.5 MMOL/L — SIGNIFICANT CHANGE UP (ref 3.5–5.3)
POTASSIUM SERPL-SCNC: 3.5 MMOL/L — SIGNIFICANT CHANGE UP (ref 3.5–5.3)
RBC # BLD: 4.7 M/UL — SIGNIFICANT CHANGE UP (ref 4.2–5.8)
RBC # FLD: 12.8 % — SIGNIFICANT CHANGE UP (ref 10.3–14.5)
SODIUM SERPL-SCNC: 140 MMOL/L — SIGNIFICANT CHANGE UP (ref 135–145)
SPECIMEN SOURCE: SIGNIFICANT CHANGE UP
T3 SERPL-MCNC: 96 NG/DL — SIGNIFICANT CHANGE UP (ref 80–200)
T4 AB SER-ACNC: 10.3 UG/DL — SIGNIFICANT CHANGE UP (ref 4.6–12)
TOTAL CHOLESTEROL/HDL RATIO MEASUREMENT: 4.5 RATIO — SIGNIFICANT CHANGE UP (ref 3.4–9.6)
TRIGL SERPL-MCNC: 175 MG/DL — HIGH (ref 10–149)
TSH SERPL-MCNC: 3.09 UU/ML — SIGNIFICANT CHANGE UP (ref 0.36–3.74)
WBC # BLD: 7.5 K/UL — SIGNIFICANT CHANGE UP (ref 3.8–10.5)
WBC # FLD AUTO: 7.5 K/UL — SIGNIFICANT CHANGE UP (ref 3.8–10.5)

## 2019-06-10 PROCEDURE — 93306 TTE W/DOPPLER COMPLETE: CPT | Mod: 26

## 2019-06-10 PROCEDURE — 70551 MRI BRAIN STEM W/O DYE: CPT | Mod: 26

## 2019-06-10 PROCEDURE — 74018 RADEX ABDOMEN 1 VIEW: CPT | Mod: 26

## 2019-06-10 PROCEDURE — 43752 NASAL/OROGASTRIC W/TUBE PLMT: CPT

## 2019-06-10 RX ORDER — ACETAMINOPHEN 500 MG
650 TABLET ORAL EVERY 6 HOURS
Refills: 0 | Status: DISCONTINUED | OUTPATIENT
Start: 2019-06-10 | End: 2019-06-13

## 2019-06-10 RX ORDER — VALPROIC ACID (AS SODIUM SALT) 250 MG/5ML
250 SOLUTION, ORAL ORAL THREE TIMES A DAY
Refills: 0 | Status: DISCONTINUED | OUTPATIENT
Start: 2019-06-10 | End: 2019-06-18

## 2019-06-10 RX ORDER — INSULIN LISPRO 100/ML
VIAL (ML) SUBCUTANEOUS AT BEDTIME
Refills: 0 | Status: DISCONTINUED | OUTPATIENT
Start: 2019-06-10 | End: 2019-06-18

## 2019-06-10 RX ORDER — INSULIN LISPRO 100/ML
VIAL (ML) SUBCUTANEOUS
Refills: 0 | Status: DISCONTINUED | OUTPATIENT
Start: 2019-06-10 | End: 2019-06-18

## 2019-06-10 RX ORDER — SODIUM CHLORIDE 9 MG/ML
1000 INJECTION, SOLUTION INTRAVENOUS
Refills: 0 | Status: DISCONTINUED | OUTPATIENT
Start: 2019-06-10 | End: 2019-06-18

## 2019-06-10 RX ADMIN — LOSARTAN POTASSIUM 100 MILLIGRAM(S): 100 TABLET, FILM COATED ORAL at 16:40

## 2019-06-10 RX ADMIN — ENOXAPARIN SODIUM 40 MILLIGRAM(S): 100 INJECTION SUBCUTANEOUS at 21:40

## 2019-06-10 RX ADMIN — DIVALPROEX SODIUM 250 MILLIGRAM(S): 500 TABLET, DELAYED RELEASE ORAL at 16:41

## 2019-06-10 RX ADMIN — Medication 5 MILLIGRAM(S): at 10:12

## 2019-06-10 RX ADMIN — Medication 2: at 18:14

## 2019-06-10 RX ADMIN — AMLODIPINE BESYLATE 10 MILLIGRAM(S): 2.5 TABLET ORAL at 16:42

## 2019-06-10 RX ADMIN — Medication 81 MILLIGRAM(S): at 16:44

## 2019-06-10 RX ADMIN — Medication 100 MILLIGRAM(S): at 16:40

## 2019-06-10 RX ADMIN — SODIUM CHLORIDE 60 MILLILITER(S): 9 INJECTION, SOLUTION INTRAVENOUS at 10:12

## 2019-06-10 RX ADMIN — Medication 5 MILLIGRAM(S): at 23:38

## 2019-06-10 RX ADMIN — PANTOPRAZOLE SODIUM 40 MILLIGRAM(S): 20 TABLET, DELAYED RELEASE ORAL at 16:41

## 2019-06-10 NOTE — DIETITIAN INITIAL EVALUATION ADULT. - PHYSICAL APPEARANCE
BMI 17.6; no edema noted; Nutrition focused physical exam conducted:  Subcutaneous fat loss: [ severe ] Orbital fat pads region, [ moderate ]Buccal fat region, [ moderate  ]Triceps region,  [ severe ]Ribs region.  Muscle wasting: [ severe ]Temples region, [ severe ]Clavicle region, [moderate ]Shoulder region, [ severe ]Scapula region, [ moderate ]Interosseous region,  [ severe ]thigh region, [ severe ]Calf region/underweight

## 2019-06-10 NOTE — OCCUPATIONAL THERAPY INITIAL EVALUATION ADULT - LIVES WITH, PROFILE
spouse/in a private house. Pt' wife reported that pt was discharged home a month ago after spending 3 years in a nursing. spouse/in a private house. Pt' wife reported that pt was discharged home a month ago after spending 3 years in a nursing. home.

## 2019-06-10 NOTE — OCCUPATIONAL THERAPY INITIAL EVALUATION ADULT - BED MOBILITY TRAINING, PT EVAL
Pt will perform bed mobility with max assist x1 to enable pt to assist with self care tasks & positioning for pressure relief within 60 days .

## 2019-06-10 NOTE — OCCUPATIONAL THERAPY INITIAL EVALUATION ADULT - SOCIAL CONCERNS
Pt voiced concerns  about the paralysis of her spouse./Complex psychosocial needs/coping issues Complex psychosocial needs/coping issues/Pt's wife  voiced concerns about the paralysis of her spouse.

## 2019-06-10 NOTE — OCCUPATIONAL THERAPY INITIAL EVALUATION ADULT - RANGE OF MOTION EXAMINATION, UPPER EXTREMITY
ROM in left shoulder , elbow wrist and digits is diminished by greater than 40 %/Left UE Passive ROM was WFL  (within functional limits)

## 2019-06-10 NOTE — OCCUPATIONAL THERAPY INITIAL EVALUATION ADULT - PARENT CAREGIVER TRAINING, OT EVAL
Care giver will safely and independentlt demonstrate how to perform ROM on pt's LUE/BLE to prevent furthers contracta  within 2 weeks

## 2019-06-10 NOTE — DIETITIAN INITIAL EVALUATION ADULT. - PERTINENT LABORATORY DATA
06-10 Na 140 mmol/L Glu 228 mg/dL<H> K+ 3.5 mmol/L Cr  1.00 mg/dL BUN 25 mg/dL<H> Phos n/a   Alb n/a   PAB n/a   Hgb 13.8 g/dL Hct 41.2 %; POCT 209, 234, 261

## 2019-06-10 NOTE — PROGRESS NOTE ADULT - SUBJECTIVE AND OBJECTIVE BOX
INTERVAL HPI/OVERNIGHT EVENTS:      PT  ADMITTED  TO  MY  SERVICE  BY  HOSPITALIST  AS PER  [facility]  PROTOCOLS  PT  KNOWN  TO ME  FROM HIS  STAY AT  [facility]  REPORTED  WITH INABILITY  TO EAT OR  SPEAK FOR  PAST 2  DAYS EVALUATED IN ER  SEEN  BY  NEUROLOGY  ADMITTED  FOR  FURTHER  W/U  AND  TREATMENT  REVIEW OF SYSTEMS:  CONSTITUTIONAL:  ALERT  UNCOMMUNICATIVE      N  MEDICATION:  amLODIPine   Tablet 10 milliGRAM(s) Oral daily  aspirin enteric coated 81 milliGRAM(s) Oral daily  dextrose 40% Gel 15 Gram(s) Oral once PRN  dextrose 5%. 1000 milliLiter(s) IV Continuous <Continuous>  dextrose 50% Injectable 12.5 Gram(s) IV Push once  diVALproex  milliGRAM(s) Oral three times a day  docusate sodium Liquid 100 milliGRAM(s) Oral two times a day  enoxaparin Injectable 40 milliGRAM(s) SubCutaneous every 24 hours  glucagon  Injectable 1 milliGRAM(s) IntraMuscular once PRN  hydrALAZINE Injectable 10 milliGRAM(s) IV Push every 4 hours PRN  insulin lispro (HumaLOG) corrective regimen sliding scale   SubCutaneous three times a day before meals  insulin lispro (HumaLOG) corrective regimen sliding scale   SubCutaneous at bedtime  losartan 100 milliGRAM(s) Oral daily  metoprolol tartrate Injectable 5 milliGRAM(s) IV Push every 6 hours PRN  pantoprazole    Tablet 40 milliGRAM(s) Oral before breakfast  polyethylene glycol 3350 17 Gram(s) Oral daily PRN  senna 2 Tablet(s) Oral at bedtime  simvastatin 40 milliGRAM(s) Oral at bedtime    Vital Signs Last 24 Hrs  T(C): 36.6 (10 Josr 2019 04:48), Max: 37.8 (2019 20:51)  T(F): 97.8 (10 Josr 2019 04:48), Max: 100 (2019 20:51)  HR: 108 (10 Josr 2019 04:48) (81 - 110)  BP: 146/89 (10 Josr 2019 04:48) (127/78 - 192/111)  BP(mean): --  RR: 18 (10 Josr 2019 04:48) (13 - 20)  SpO2: 95% (10 Josr 2019 04:48) (95% - 98%)    PHYSICAL EXAM:  GENERAL: NAD,   EYES:  conjunctiva and sclera adriana  NECK: Supple, No JVD, Normal thyroid  NERVOUS SYSTEM:  Alert oriented QUADRIPARESIS  CHEST/LUNG: Clear    HEART: Regular rate and rhythm; No murmurs, rubs, or gallops  ABDOMEN: Soft, Nontender, Nondistended; Bowel sounds present  EXTREMITIES:  no  edema no  tenderness  SKIN: No rashes   LABS:                        13.8   7.50  )-----------( 415      ( 10 Josr 2019 07:36 )             41.2     06-10    140  |  104  |  25<H>  ----------------------------<  228<H>  3.5   |  27  |  1.00    Ca    9.2      10 Josr 2019 07:36    TPro  8.2  /  Alb  3.8  /  TBili  1.1  /  DBili  x   /  AST  20  /  ALT  16  /  AlkPhos  138<H>  06-09    PT/INR - ( 2019 13:03 )   PT: 13.0 sec;   INR: 1.16 ratio         PTT - ( 2019 13:03 )  PTT:28.4 sec  Urinalysis Basic - ( 2019 17:48 )    Color: Yellow / Appearance: Clear / S.015 / pH: x  Gluc: x / Ketone: Negative  / Bili: Negative / Urobili: Negative mg/dL   Blood: x / Protein: 15 mg/dL / Nitrite: Negative   Leuk Esterase: Negative / RBC: Negative /HPF / WBC Negative   Sq Epi: x / Non Sq Epi: x / Bacteria: x      CAPILLARY BLOOD GLUCOSE      POCT Blood Glucose.: 234 mg/dL (10 Josr 2019 07:54)  POCT Blood Glucose.: 209 mg/dL (2019 22:40)  POCT Blood Glucose.: 261 mg/dL (2019 12:12)      RADIOLOGY & ADDITIONAL TESTS:    Imaging reports  Personally Reviewed:  [ X] YES  [ ] NO    Consultant(s) Notes Reviewed:  [X ] YES  [ ] NO    Care Discussed with Consultants/Other Providers [X ] YES  [ ] NO  Problem/Plan - 1:  ·  Problem: Cerebrovascular accident (CVA), unspecified mechanism.  Plan: mri  carotid  doppler  tte  s/s evaluation     Problem/Plan - 2:  ·  Problem: Hypercholesteremia.     Problem/Plan - 3:  ·  Problem: Essential hypertension. losartan  metoprolol    Problem/Plan - 4:  ·  Problem: Diabetes. insulin  coverage     Problem/Plan - 5:  ·  Problem: Severe protein-calorie malnutrition. ngt  wife  will  decide  on  GT  placement    Problem/Plan - 6:  Problem: History of CVA (cerebrovascular accident). asa  statin    Problem/Plan - 7:  ·  Problem: Hemiparesis affecting left side as late effect of stroke. as  above    Problem/Plan - 8:  ·  Problem: Need for hepatitis C screening test.     Problem/Plan - 9:  ·  Problem: Chronic diastolic (congestive) heart failure.

## 2019-06-10 NOTE — DIETITIAN INITIAL EVALUATION ADULT. - ENERGY NEEDS
Ht (cm): 185.42  Wt (kg): 60.4 (06-10)  BMI: 17.6    IBW:  83.4    %IBW: 70%       UBW:  77.1    %UBW: 78%

## 2019-06-10 NOTE — PHYSICAL THERAPY INITIAL EVALUATION ADULT - ADDITIONAL COMMENTS
Patient lives c wife in a pvt house. Has HHA 10 hrs/7 days. Non-ambulatory, total dependent c all ADL's and transfers using Arjo lift. As per wife, patient was a NH resident for 3 years and was discharge to home a month ago. Patient owns Arjo lift, wheelchair and hospital bed.

## 2019-06-10 NOTE — OCCUPATIONAL THERAPY INITIAL EVALUATION ADULT - ADDITIONAL COMMENTS
Prior to admission, pt was non ambulatory and required total assistance for all levels of care. Pt has a HHA for 10 hours daily and wife assisted in his care after HHA  leaves each day.  Pt needed an arjo lift for bed to w/c transfers. Pt also has  hospital bed. All prehension patterns are impaired in LUE and is it kept in an involuntary fisted position. LUE/BLE are non functional . Pt is unable to cross midline, make needs/ wants known and sit with support. Tendon reflexes and movement velocity are absent in LUE. LE. Pt actively uses right UE.

## 2019-06-10 NOTE — PATIENT PROFILE ADULT - TOBACCO USE
Subjective:      Patient ID: Andra Newell is a 59 y.o. female.    Chief Complaint:MRSA      History of Present Illness    58 y/o female recently seen by ID for pre-kidney transplant evaluation, return to clinic today after screening test showed Strongyloides IgG positive. Patient has no GI symptoms, has never been told about been positive for Strongyloides before. She has travel to multiple places in [facility], [facility], [place], [place] She is born and raised in [place].    Review of Systems   Constitution: Positive for malaise/fatigue. Negative for chills, decreased appetite, fever, weakness, night sweats, weight gain and weight loss.   HENT: Negative for congestion, ear pain, headaches, hearing loss, hoarse voice, sore throat and tinnitus.    Eyes: Negative for blurred vision, redness and visual disturbance.   Cardiovascular: Negative for chest pain, leg swelling and palpitations.   Respiratory: Positive for cough. Negative for hemoptysis, shortness of breath and sputum production.    Hematologic/Lymphatic: Negative for adenopathy. Does not bruise/bleed easily.   Skin: Negative for dry skin, itching, rash and suspicious lesions.   Musculoskeletal: Negative for back pain, joint pain, myalgias and neck pain.   Gastrointestinal: Negative for abdominal pain, constipation, diarrhea, heartburn, nausea and vomiting.   Genitourinary: Negative for dysuria, flank pain, frequency, hematuria, hesitancy and urgency.   Neurological: Negative for dizziness, numbness and paresthesias.   Psychiatric/Behavioral: Positive for depression. Negative for memory loss. The patient does not have insomnia and is not nervous/anxious.      Objective:   Physical Exam   Constitutional: She is oriented to person, place, and time. No distress.   HENT:   Head: Normocephalic.   Mouth/Throat: No oropharyngeal exudate.   Eyes: No scleral icterus.   Cardiovascular: Normal rate and regular rhythm.   Exam reveals no gallop and no friction rub.    No murmur heard.  Pulmonary/Chest: Effort normal. No respiratory distress. She has no wheezes. She has no rales.   Abdominal: Soft. Bowel sounds are normal. She exhibits no distension. There is no tenderness. There is no rebound and no guarding.   Musculoskeletal: She exhibits no edema.   Lymphadenopathy:     She has no cervical adenopathy.   Neurological: She is alert and oriented to person, place, and time. No cranial nerve deficit.   Skin: No rash noted. She is not diaphoretic. No erythema.   Psychiatric: She has a normal mood and affect.   Vitals reviewed.    Assessment:       1) Intestinal Strongyloidiasis   - Strongyloides IgG positive    Plan:       1) Ivermectin 21 g daily for 2 doses (based on 200 umg/kg/day dose)    Johny Conte MD  Infectious Disease Fellow, PGY-5  Pager: [phone]  [facility]         Never smoker

## 2019-06-10 NOTE — OCCUPATIONAL THERAPY INITIAL EVALUATION ADULT - PLANNED THERAPY INTERVENTIONS, OT EVAL
motor coordination training/energy conservation techniques/balance training/bed mobility training/stretching/parent/caregiver training.../strengthening

## 2019-06-10 NOTE — OCCUPATIONAL THERAPY INITIAL EVALUATION ADULT - ANTICIPATED DISCHARGE DISPOSITION, OT EVAL
Recommend discharge home with resumption of all prior support services. Pt owns a w/c, hospital bed and arjo lift for transfers.

## 2019-06-10 NOTE — OCCUPATIONAL THERAPY INITIAL EVALUATION ADULT - IMPAIRMENTS CONTRIBUTING IMPAIRED BED MOBILITY, REHAB EVAL
impaired balance/decreased strength/cognition/decreased ROM/decreased flexibility/impaired motor control/narrow base of support/impaired postural control/abnormal muscle tone/pain

## 2019-06-10 NOTE — OCCUPATIONAL THERAPY INITIAL EVALUATION ADULT - RANGE OF MOTION EXAMINATION, LOWER EXTREMITY
bilateral LE Passive ROM was WFL  (within functional limits)/ROM in left and right  hip, knee , ankle and digits is diminished by greater than 40 %

## 2019-06-10 NOTE — DIETITIAN INITIAL EVALUATION ADULT. - PERTINENT MEDS FT
MEDICATIONS  (STANDING):  amLODIPine   Tablet 10 milliGRAM(s) Oral daily  aspirin enteric coated 81 milliGRAM(s) Oral daily  dextrose 5%. 1000 milliLiter(s) (50 mL/Hr) IV Continuous <Continuous>  dextrose 50% Injectable 12.5 Gram(s) IV Push once  diVALproex  milliGRAM(s) Oral three times a day  docusate sodium Liquid 100 milliGRAM(s) Oral two times a day  enoxaparin Injectable 40 milliGRAM(s) SubCutaneous every 24 hours  insulin lispro (HumaLOG) corrective regimen sliding scale   SubCutaneous three times a day before meals  insulin lispro (HumaLOG) corrective regimen sliding scale   SubCutaneous at bedtime  losartan 100 milliGRAM(s) Oral daily  pantoprazole    Tablet 40 milliGRAM(s) Oral before breakfast  senna 2 Tablet(s) Oral at bedtime  simvastatin 40 milliGRAM(s) Oral at bedtime  sodium chloride 0.45%. 1000 milliLiter(s) (60 mL/Hr) IV Continuous <Continuous>    MEDICATIONS  (PRN):  dextrose 40% Gel 15 Gram(s) Oral once PRN Blood Glucose LESS THAN 70 milliGRAM(s)/deciliter  glucagon  Injectable 1 milliGRAM(s) IntraMuscular once PRN Glucose LESS THAN 70 milligrams/deciliter  hydrALAZINE Injectable 10 milliGRAM(s) IV Push every 4 hours PRN sbp > 165  metoprolol tartrate Injectable 5 milliGRAM(s) IV Push every 6 hours PRN SBP > 165 bpm, hold if HR < 65  polyethylene glycol 3350 17 Gram(s) Oral daily PRN Constipation

## 2019-06-10 NOTE — DIETITIAN INITIAL EVALUATION ADULT. - OTHER INFO
Pt seen for nutrition consult & BMI < 18. Pt lives at home with spouse, recently removed from LTC facility per family wishes. Pt unable to chew and swallow, NGT placed. Spoke with wife via [employer]. Wife reported pt has been refusing food x 2 days PTA, pt with very poor PO intake x 5 weeks after leaving LTC facility to live with wife. Pt wife reported pt was consuming very soft to pureed foods with thin liquids PTA. NGT placed 06/10 and tube feed Rx ordered, made wife aware. Discussed with wife nutrition care plan and will continue nutrition support as she wishes.

## 2019-06-10 NOTE — OCCUPATIONAL THERAPY INITIAL EVALUATION ADULT - GENERAL OBSERVATIONS, REHAB EVAL
Pt was seen for initial OT consult, encountered in bed on cardiac monitoring in NAD; pt was AA&Oxto self, non verbal & unable  to follow command. Pt facial expression is indicative of pain in LUE & BLE with movement; Pt has increased tone in LUE/BLE  with multiple contractures.

## 2019-06-10 NOTE — OCCUPATIONAL THERAPY INITIAL EVALUATION ADULT - STRENGTHENING, PT EVAL
Pt will increased RUE muscle strength by 1 full grade to  enable pt to assist with self care tasks & positioning for pressure relief.

## 2019-06-10 NOTE — PHYSICAL THERAPY INITIAL EVALUATION ADULT - GENERAL OBSERVATIONS, REHAB EVAL
Chart (EMR) reviewed. Received leftt sidelying c HOB elevated, NAD.+cardiac monitor donned. Alert. Non-verbal. Unable to follow command. Wife (Eveline) present. Patient total dependent c functional mobility.

## 2019-06-10 NOTE — PHYSICAL THERAPY INITIAL EVALUATION ADULT - PERTINENT HX OF CURRENT PROBLEM, REHAB EVAL
Patient is a 68 y/o male admitted to [facility] due to CVA unspecified mechanism.  CT of head negative acute findings.

## 2019-06-10 NOTE — OCCUPATIONAL THERAPY INITIAL EVALUATION ADULT - PRECAUTIONS/LIMITATIONS, REHAB EVAL
Pt should be turned & positioned every 2 hours to prevent skin breakdown due to lack of mobility. ROM should be provided to LUE/BLE to preserve current joint integrity & prevent further contractures/cardiac precautions/fall precautions

## 2019-06-10 NOTE — PROGRESS NOTE ADULT - ASSESSMENT
Subjective Complaints:  Historian:             Vital Signs Last 24 Hrs  T(C): 37.5 (10 Josr 2019 16:30), Max: 37.8 (2019 20:51)  T(F): 99.5 (10 Josr 2019 16:30), Max: 100 (2019 20:51)  HR: 95 (10 Josr 2019 16:30) (95 - 125)  BP: 142/74 (10 Josr 2019 16:30) (142/74 - 159/89)  BP(mean): --  RR: 17 (10 Josr 2019 16:30) (16 - 20)  SpO2: 96% (10 Josr 2019 16:30) (95% - 97%)    GENERAL PHYSICAL EXAM:  General:  Appears stated age, well-groomed, well-nourished, no distress  HEENT:  NC/AT, patent nares w/ pink mucosa, OP clear w/o lesions, PERRL, EOMI, conjunctivae clear, no thyromegaly, nodules, adenopathy, no JVD  Chest:  Full & symmetric excursion, no increased effort, breath sounds clear  Cardiovascular:  Regular rhythm, S1, S2, no murmur/rub/S3/S4, no carotid/femoral/abdominal bruit, radial/pedal pulses 2+, no edema  Abdomen:  Soft, non-tender, non-distended, normoactive bowel sounds, no HSM  Extremities:  Gait & station:   Digits:   Nails:   Joints, Bones, Muscles:   ROM:   Stability:  Skin:  No rash/erythema/ecchymoses/petechiae/wounds/abscess/warm/dry  Musculoskeletal:  Full ROM in all joints w/o swelling/tenderness/effusion        LABS:                        13.8   7.50  )-----------( 415      ( 10 Josr 2019 07:36 )             41.2     06-10    140  |  104  |  25<H>  ----------------------------<  228<H>  3.5   |  27  |  1.00    Ca    9.2      10 Josr 2019 07:36    TPro  8.2  /  Alb  3.8  /  TBili  1.1  /  DBili  x   /  AST  20  /  ALT  16  /  AlkPhos  138<H>  06-09    PT/INR - ( 2019 13:03 )   PT: 13.0 sec;   INR: 1.16 ratio         PTT - ( 2019 13:03 )  PTT:28.4 sec  Urinalysis Basic - ( 2019 17:48 )    Color: Yellow / Appearance: Clear / S.015 / pH: x  Gluc: x / Ketone: Negative  / Bili: Negative / Urobili: Negative mg/dL   Blood: x / Protein: 15 mg/dL / Nitrite: Negative   Leuk Esterase: Negative / RBC: Negative /HPF / WBC Negative   Sq Epi: x / Non Sq Epi: x / Bacteria: x        RADIOLOGY & ADDITIONAL STUDIES:        Neurology Progress Note:      Mental Status:  awaek open eyes mute  old cva does not folws commands       Cranial Nerves: left face weakness       Motor:  left spastic hemepresis old cva rarm 3/5 legs weakness         Sensory: responds to pain       Cerebellar: deferd       Gait:unable to walk       Assesment/Plan: old cva left spastic hemepresis mute no t eating poor  memory mri  mri daniela  noted for suba cute rehab peg eval

## 2019-06-10 NOTE — DIETITIAN INITIAL EVALUATION ADULT. - NS AS NUTRI INTERV ENTERAL NUTRITION
Route/Composition/Schedule/Concentration/Rate/Volume/Feeding tube flush/Consider increasing TF: Glucerna 1.5; total volume 1680mL/24 hrs; start at 30 mL increase to by 10mL q8hr until goal rate of 70mL/hr is reached. (Provides:  2520kcal, 137 grams of protein; 1280mL free h2o) + 200 mL free H2O flush q6hr

## 2019-06-10 NOTE — OCCUPATIONAL THERAPY INITIAL EVALUATION ADULT - PERTINENT HX OF CURRENT PROBLEM, REHAB EVAL
Pt presented to ER on due to acute onset of neurological deficit. Pt is diagnosed with CVA with unspecified mechanism . MRI is pending ; head CT on 6/9/19 results confirm chronic ischemic changes again noted,

## 2019-06-10 NOTE — OCCUPATIONAL THERAPY INITIAL EVALUATION ADULT - BALANCE TRAINING, PT EVAL
Pt with increased sitting balance from poor+ to fair in 6o days to sit upright in his w/c and interact with his environment

## 2019-06-11 LAB
ALBUMIN SERPL ELPH-MCNC: 3.5 G/DL — SIGNIFICANT CHANGE UP (ref 3.3–5)
ALP SERPL-CCNC: 145 U/L — HIGH (ref 40–120)
ALT FLD-CCNC: 15 U/L — SIGNIFICANT CHANGE UP (ref 12–78)
ANION GAP SERPL CALC-SCNC: 14 MMOL/L — SIGNIFICANT CHANGE UP (ref 5–17)
AST SERPL-CCNC: 25 U/L — SIGNIFICANT CHANGE UP (ref 15–37)
BILIRUB SERPL-MCNC: 1.2 MG/DL — SIGNIFICANT CHANGE UP (ref 0.2–1.2)
BUN SERPL-MCNC: 27 MG/DL — HIGH (ref 7–23)
CALCIUM SERPL-MCNC: 9.5 MG/DL — SIGNIFICANT CHANGE UP (ref 8.5–10.1)
CHLORIDE SERPL-SCNC: 105 MMOL/L — SIGNIFICANT CHANGE UP (ref 96–108)
CO2 SERPL-SCNC: 21 MMOL/L — LOW (ref 22–31)
CREAT SERPL-MCNC: 1.28 MG/DL — SIGNIFICANT CHANGE UP (ref 0.5–1.3)
GLUCOSE BLDC GLUCOMTR-MCNC: 154 MG/DL — HIGH (ref 70–99)
GLUCOSE BLDC GLUCOMTR-MCNC: 175 MG/DL — HIGH (ref 70–99)
GLUCOSE BLDC GLUCOMTR-MCNC: 186 MG/DL — HIGH (ref 70–99)
GLUCOSE BLDC GLUCOMTR-MCNC: 210 MG/DL — HIGH (ref 70–99)
GLUCOSE BLDC GLUCOMTR-MCNC: 257 MG/DL — HIGH (ref 70–99)
GLUCOSE SERPL-MCNC: 219 MG/DL — HIGH (ref 70–99)
HBA1C BLD-MCNC: 6.8 % — HIGH (ref 4–5.6)
HCT VFR BLD CALC: 41.5 % — SIGNIFICANT CHANGE UP (ref 39–50)
HGB BLD-MCNC: 13.9 G/DL — SIGNIFICANT CHANGE UP (ref 13–17)
MCHC RBC-ENTMCNC: 29.1 PG — SIGNIFICANT CHANGE UP (ref 27–34)
MCHC RBC-ENTMCNC: 33.5 GM/DL — SIGNIFICANT CHANGE UP (ref 32–36)
MCV RBC AUTO: 87 FL — SIGNIFICANT CHANGE UP (ref 80–100)
NRBC # BLD: 0 /100 WBCS — SIGNIFICANT CHANGE UP (ref 0–0)
PLATELET # BLD AUTO: 416 K/UL — HIGH (ref 150–400)
POTASSIUM SERPL-MCNC: 3.4 MMOL/L — LOW (ref 3.5–5.3)
POTASSIUM SERPL-SCNC: 3.4 MMOL/L — LOW (ref 3.5–5.3)
PROT SERPL-MCNC: 8.1 GM/DL — SIGNIFICANT CHANGE UP (ref 6–8.3)
RBC # BLD: 4.77 M/UL — SIGNIFICANT CHANGE UP (ref 4.2–5.8)
RBC # FLD: 12.8 % — SIGNIFICANT CHANGE UP (ref 10.3–14.5)
SODIUM SERPL-SCNC: 140 MMOL/L — SIGNIFICANT CHANGE UP (ref 135–145)
WBC # BLD: 7.91 K/UL — SIGNIFICANT CHANGE UP (ref 3.8–10.5)
WBC # FLD AUTO: 7.91 K/UL — SIGNIFICANT CHANGE UP (ref 3.8–10.5)

## 2019-06-11 PROCEDURE — 74018 RADEX ABDOMEN 1 VIEW: CPT | Mod: 26

## 2019-06-11 RX ORDER — PANTOPRAZOLE SODIUM 20 MG/1
40 TABLET, DELAYED RELEASE ORAL
Refills: 0 | Status: DISCONTINUED | OUTPATIENT
Start: 2019-06-11 | End: 2019-06-18

## 2019-06-11 RX ADMIN — Medication 5 MILLIGRAM(S): at 12:05

## 2019-06-11 RX ADMIN — SIMVASTATIN 40 MILLIGRAM(S): 20 TABLET, FILM COATED ORAL at 01:01

## 2019-06-11 RX ADMIN — Medication 100 MILLIGRAM(S): at 01:01

## 2019-06-11 RX ADMIN — Medication 650 MILLIGRAM(S): at 13:20

## 2019-06-11 RX ADMIN — SIMVASTATIN 40 MILLIGRAM(S): 20 TABLET, FILM COATED ORAL at 21:51

## 2019-06-11 RX ADMIN — SENNA PLUS 2 TABLET(S): 8.6 TABLET ORAL at 21:51

## 2019-06-11 RX ADMIN — Medication 250 MILLIGRAM(S): at 21:53

## 2019-06-11 RX ADMIN — Medication 250 MILLIGRAM(S): at 06:14

## 2019-06-11 RX ADMIN — Medication 250 MILLIGRAM(S): at 13:22

## 2019-06-11 RX ADMIN — Medication 100 MILLIGRAM(S): at 17:17

## 2019-06-11 RX ADMIN — ENOXAPARIN SODIUM 40 MILLIGRAM(S): 100 INJECTION SUBCUTANEOUS at 21:53

## 2019-06-11 RX ADMIN — Medication 6: at 06:24

## 2019-06-11 RX ADMIN — Medication 2: at 16:37

## 2019-06-11 RX ADMIN — Medication 650 MILLIGRAM(S): at 04:54

## 2019-06-11 RX ADMIN — Medication 250 MILLIGRAM(S): at 01:02

## 2019-06-11 RX ADMIN — Medication 100 MILLIGRAM(S): at 06:13

## 2019-06-11 RX ADMIN — LOSARTAN POTASSIUM 100 MILLIGRAM(S): 100 TABLET, FILM COATED ORAL at 06:13

## 2019-06-11 RX ADMIN — PANTOPRAZOLE SODIUM 40 MILLIGRAM(S): 20 TABLET, DELAYED RELEASE ORAL at 06:15

## 2019-06-11 RX ADMIN — Medication 81 MILLIGRAM(S): at 12:04

## 2019-06-11 RX ADMIN — SENNA PLUS 2 TABLET(S): 8.6 TABLET ORAL at 01:01

## 2019-06-11 RX ADMIN — AMLODIPINE BESYLATE 10 MILLIGRAM(S): 2.5 TABLET ORAL at 06:14

## 2019-06-11 RX ADMIN — Medication 650 MILLIGRAM(S): at 12:04

## 2019-06-11 RX ADMIN — Medication 4: at 12:04

## 2019-06-11 RX ADMIN — Medication 650 MILLIGRAM(S): at 03:54

## 2019-06-11 NOTE — PROGRESS NOTE ADULT - ASSESSMENT
Subjective Complaints:  Historian:             Vital Signs Last 24 Hrs  T(C): 36.8 (2019 16:47), Max: 38.6 (2019 11:08)  T(F): 98.2 (2019 16:47), Max: 101.5 (2019 11:08)  HR: 96 (2019 16:47) (94 - 135)  BP: 129/77 (2019 16:47) (129/77 - 189/109)  BP(mean): --  RR: 19 (2019 16:47) (18 - 19)  SpO2: 97% (2019 16:47) (95% - 99%)    GENERAL PHYSICAL EXAM:  General:  Appears stated age, well-groomed, well-nourished, no distress  HEENT:  NC/AT, patent nares w/ pink mucosa, OP clear w/o lesions, PERRL, EOMI, conjunctivae clear, no thyromegaly, nodules, adenopathy, no JVD  Chest:  Full & symmetric excursion, no increased effort, breath sounds clear  Cardiovascular:  Regular rhythm, S1, S2, no murmur/rub/S3/S4, no carotid/femoral/abdominal bruit, radial/pedal pulses 2+, no edema  Abdomen:  Soft, non-tender, non-distended, normoactive bowel sounds, no HSM  Extremities:  Gait & station:   Digits:   Nails:   Joints, Bones, Muscles:   ROM:   Stability:  Skin:  No rash/erythema/ecchymoses/petechiae/wounds/abscess/warm/dry  Musculoskeletal:  Full ROM in all joints w/o swelling/tenderness/effusion        LABS:                        13.9   7.91  )-----------( 416      ( 2019 08:24 )             41.5     06-11    140  |  105  |  27<H>  ----------------------------<  219<H>  3.4<L>   |  21<L>  |  1.28    Ca    9.5      2019 08:24    TPro  8.1  /  Alb  3.5  /  TBili  1.2  /  DBili  x   /  AST  25  /  ALT  15  /  AlkPhos  145<H>  06-11      Urinalysis Basic - ( 2019 17:48 )    Color: Yellow / Appearance: Clear / S.015 / pH: x  Gluc: x / Ketone: Negative  / Bili: Negative / Urobili: Negative mg/dL   Blood: x / Protein: 15 mg/dL / Nitrite: Negative   Leuk Esterase: Negative / RBC: Negative /HPF / WBC Negative   Sq Epi: x / Non Sq Epi: x / Bacteria: x        RADIOLOGY & ADDITIONAL STUDIES:        Neurology Progress Note:      Mental Status: letahregic arousable open eyes does not follws commands       Cranial Nerves: face equal       Motor:  quadroparesis left arm contracted         Sensory:intact to pain       Cerebellar: deferd       Gait:deferd       Assesment/Plan: old cva small r cva not eating n/g tube  feeding  discuss iwth family for peg  old cva multi infact deemntia

## 2019-06-11 NOTE — PROGRESS NOTE ADULT - SUBJECTIVE AND OBJECTIVE BOX
INTERVAL HPI/OVERNIGHT EVENTS:        REVIEW OF SYSTEMS:  CONSTITUTIONAL:  continues  alert with  NGT  uncommunicative      MEDICATION:  acetaminophen    Suspension .. 650 milliGRAM(s) Oral every 6 hours PRN  amLODIPine   Tablet 10 milliGRAM(s) Oral daily  aspirin enteric coated 81 milliGRAM(s) Oral daily  dextrose 40% Gel 15 Gram(s) Oral once PRN  dextrose 5%. 1000 milliLiter(s) IV Continuous <Continuous>  dextrose 50% Injectable 12.5 Gram(s) IV Push once  docusate sodium Liquid 100 milliGRAM(s) Oral two times a day  enoxaparin Injectable 40 milliGRAM(s) SubCutaneous every 24 hours  glucagon  Injectable 1 milliGRAM(s) IntraMuscular once PRN  hydrALAZINE Injectable 10 milliGRAM(s) IV Push every 4 hours PRN  insulin lispro (HumaLOG) corrective regimen sliding scale   SubCutaneous three times a day before meals  insulin lispro (HumaLOG) corrective regimen sliding scale   SubCutaneous at bedtime  losartan 100 milliGRAM(s) Oral daily  metoprolol tartrate Injectable 5 milliGRAM(s) IV Push every 6 hours PRN  pantoprazole   Suspension 40 milliGRAM(s) Oral before breakfast  polyethylene glycol 3350 17 Gram(s) Oral daily PRN  senna 2 Tablet(s) Oral at bedtime  simvastatin 40 milliGRAM(s) Oral at bedtime  sodium chloride 0.45%. 1000 milliLiter(s) IV Continuous <Continuous>  valproic  acid Syrup 250 milliGRAM(s) Enteral Tube three times a day    Vital Signs Last 24 Hrs  T(C): 36.9 (2019 04:50), Max: 37.5 (10 Josr 2019 16:30)  T(F): 98.4 (2019 04:50), Max: 99.5 (10 Josr 2019 16:30)  HR: 105 (2019 04:50) (95 - 125)  BP: 137/84 (2019 04:50) (137/84 - 175/107)  BP(mean): --  RR: 18 (2019 04:50) (16 - 18)  SpO2: 96% (2019 04:50) (95% - 97%)    PHYSICAL EXAM:  GENERAL: NAD, well-groomed, well-developed  EYES:  conjunctiva and sclera clear  ENMT:  Moist mucous membranes,   NECK: Supple, No JVD, Normal thyroid  NERVOUS SYSTEM:  Alert L  hemiplegia  contractures     CHEST/LUNG: Clear    HEART: Regular rate and rhythm; No murmurs, rubs, or gallops  ABDOMEN: Soft, Nontender, Nondistended; Bowel sounds present  EXTREMITIES:  no  edema no  tenderness  SKIN: No rashes   LABS:                        13.8   7.50  )-----------( 415      ( 10 Josr 2019 07:36 )             41.2     06-10    140  |  104  |  25<H>  ----------------------------<  228<H>  3.5   |  27  |  1.00    Ca    9.2      10 Josr 2019 07:36    TPro  8.2  /  Alb  3.8  /  TBili  1.1  /  DBili  x   /  AST  20  /  ALT  16  /  AlkPhos  138<H>  06-09    PT/INR - ( 2019 13:03 )   PT: 13.0 sec;   INR: 1.16 ratio         PTT - ( 2019 13:03 )  PTT:28.4 sec  Urinalysis Basic - ( 2019 17:48 )    Color: Yellow / Appearance: Clear / S.015 / pH: x  Gluc: x / Ketone: Negative  / Bili: Negative / Urobili: Negative mg/dL   Blood: x / Protein: 15 mg/dL / Nitrite: Negative   Leuk Esterase: Negative / RBC: Negative /HPF / WBC Negative   Sq Epi: x / Non Sq Epi: x / Bacteria: x      CAPILLARY BLOOD GLUCOSE      POCT Blood Glucose.: 257 mg/dL (2019 06:18)  POCT Blood Glucose.: 133 mg/dL (10 Josr 2019 21:43)  POCT Blood Glucose.: 199 mg/dL (10 Josr 2019 16:58)  POCT Blood Glucose.: 209 mg/dL (10 Josr 2019 11:24)  POCT Blood Glucose.: 234 mg/dL (10 Josr 2019 07:54)      RADIOLOGY & ADDITIONAL TESTS:    Imaging reports  Personally Reviewed:  [x ] YES  [ ] NO    Consultant(s) Notes Reviewed:  [x ] YES  [ ] NO    Care Discussed with Consultants/Other Providers [ x] YES  [ ] NO  Problem/Plan - 1:  ·  Problem: Cerebrovascular accident (CVA), unspecified mechanism.  Plan:  s/s evaluation     Problem/Plan - 2:  ·  Problem: Hypercholesteremia.     Problem/Plan - 3:  ·  Problem: Essential hypertension. losartan  metoprolol    Problem/Plan - 4:  ·  Problem: Diabetes. insulin  coverage     Problem/Plan - 5:  ·  Problem: Severe protein-calorie malnutrition. ngt feedings wife  will  decide  on  GT  placement    Problem/Plan - 6:  Problem: History of CVA (cerebrovascular accident). asa  statin    Problem/Plan - 7:  ·  Problem: Hemiparesis affecting left side as late effect of stroke. as  above    Problem/Plan - 8:  ·  Problem: Need for hepatitis C screening test.     Problem/Plan - 9:  ·  Problem: Chronic diastolic (congestive) heart failure.

## 2019-06-11 NOTE — SWALLOW BEDSIDE ASSESSMENT ADULT - SLP GENERAL OBSERVATIONS
pt seen bedside, NG tube in place. alert and oriented x1 to self. pt nonverbal and unable to respond to autobiographical/orientation questions. pt clenching blanket with teeth upon SLP arrival, SLP used spoon to have pt release. pt used minimal gesture behaviors to communicate. pt side lying on left side. noted left hemiparesis and right hand shaking motions. pt made inconsistent eye contact with SLP. pt seen bedside, NG tube in place. alert and oriented x1 to self. pt nonverbal and did not respond to autobiographical/orientation questions. pt did not follow one step directions/models. pt used minimal gesture behaviors to communicate. pt side lying on left side. noted left hemiparesis and right hand shaking motions. pt made inconsistent eye contact with SLP.

## 2019-06-11 NOTE — SWALLOW BEDSIDE ASSESSMENT ADULT - SWALLOW EVAL: DIAGNOSIS
pt presented with baseline reduced verbalization therefore limiting the evaluation. however noted oropharyngeal phases of swallow marked by inconsistent groping labial seal, slow oral transport posterior, reduced oral grading, lingual residue and multiple swallows. suspect timely swallow trigger with good laryngeal elevation. pt presented with decreased cognition therefore limiting the evaluation. however noted oropharyngeal phases of swallow marked by inconsistent groping labial seal, slow oral transport posterior, reduced oral grading, and lingual residue. suspect timely swallow trigger with good laryngeal elevation and multiple swallows. no overt signs of aspiration with consistencies given.

## 2019-06-11 NOTE — SWALLOW BEDSIDE ASSESSMENT ADULT - H & P REVIEW
yes/Patient admitted to Dr. Valenzuela. Unable to see patient within 3 hours. H&P and initial orders by hospitalist team. Patient to be followed up for continuity of care with Dr. Valenzuela in 24h. yes/Patient is a 68 y/o M w/ PMH HTN, DM, HLD, CVA x2 presenting to ED w/ L-sided facial droop, decreased speech, decreased appetite x 2 days. Per patient's wife patient's baseline mental status is minimally verbal with decreased use of L-side. Wife states patient had been sleeping more lately since starting Trazodone and not been able to give his other medications as prescribed due to increased somnolence.

## 2019-06-11 NOTE — SWALLOW BEDSIDE ASSESSMENT ADULT - ORAL PREPARATORY PHASE
Reduced oral grading/pt masticates puree Reduced oral grading Lateral loss of bolus/Reduced oral grading prolonged mastication/Decreased mastication ability/Reduced oral grading Reduced oral grading/Decreased mastication ability

## 2019-06-11 NOTE — SWALLOW BEDSIDE ASSESSMENT ADULT - SWALLOW EVAL: RECOMMENDED FEEDING/EATING TECHNIQUES
check mouth frequently for oral residue/pocketing/maintain upright posture during/after eating for 30 mins/oral hygiene/small sips/bites/allow for swallow between intakes

## 2019-06-11 NOTE — SWALLOW BEDSIDE ASSESSMENT ADULT - COMMENTS
CT Head 6/9/2019 1)  chronic ischemic changes again noted, stable and compared with prior CT. No acute abnormality suggested. 2)  follow-up MR imaging recommended. Xray Chest 6/9/2019 Clear lungs. No acute airspace disease suggested.  MRI Head 6/10/2019 Somewhat limited exam. Question tiny right PCA distribution infarct. Chronic volume loss with microhemorrhages implying hypertensive angiopathy. Xray Chest 6/9/2019 Clear lungs. No acute airspace disease suggested.    MRI Head 6/10/2019 Somewhat limited exam. Question tiny right PCA distribution infarct. Chronic volume loss with microhemorrhages implying hypertensive angiopathy.

## 2019-06-12 LAB
ALBUMIN SERPL ELPH-MCNC: 3.2 G/DL — LOW (ref 3.3–5)
ALP SERPL-CCNC: 138 U/L — HIGH (ref 40–120)
ALT FLD-CCNC: 20 U/L — SIGNIFICANT CHANGE UP (ref 12–78)
ANION GAP SERPL CALC-SCNC: 12 MMOL/L — SIGNIFICANT CHANGE UP (ref 5–17)
AST SERPL-CCNC: 45 U/L — HIGH (ref 15–37)
BILIRUB SERPL-MCNC: 0.8 MG/DL — SIGNIFICANT CHANGE UP (ref 0.2–1.2)
BUN SERPL-MCNC: 48 MG/DL — HIGH (ref 7–23)
CALCIUM SERPL-MCNC: 9.4 MG/DL — SIGNIFICANT CHANGE UP (ref 8.5–10.1)
CHLORIDE SERPL-SCNC: 108 MMOL/L — SIGNIFICANT CHANGE UP (ref 96–108)
CO2 SERPL-SCNC: 23 MMOL/L — SIGNIFICANT CHANGE UP (ref 22–31)
CREAT SERPL-MCNC: 1.53 MG/DL — HIGH (ref 0.5–1.3)
GLUCOSE BLDC GLUCOMTR-MCNC: 157 MG/DL — HIGH (ref 70–99)
GLUCOSE BLDC GLUCOMTR-MCNC: 206 MG/DL — HIGH (ref 70–99)
GLUCOSE BLDC GLUCOMTR-MCNC: 208 MG/DL — HIGH (ref 70–99)
GLUCOSE BLDC GLUCOMTR-MCNC: 312 MG/DL — HIGH (ref 70–99)
GLUCOSE SERPL-MCNC: 284 MG/DL — HIGH (ref 70–99)
HCT VFR BLD CALC: 39.1 % — SIGNIFICANT CHANGE UP (ref 39–50)
HCV RNA FLD QL NAA+PROBE: SIGNIFICANT CHANGE UP
HCV RNA SPEC QL PROBE+SIG AMP: SIGNIFICANT CHANGE UP
HGB BLD-MCNC: 13.2 G/DL — SIGNIFICANT CHANGE UP (ref 13–17)
MCHC RBC-ENTMCNC: 29.7 PG — SIGNIFICANT CHANGE UP (ref 27–34)
MCHC RBC-ENTMCNC: 33.8 GM/DL — SIGNIFICANT CHANGE UP (ref 32–36)
MCV RBC AUTO: 87.9 FL — SIGNIFICANT CHANGE UP (ref 80–100)
NRBC # BLD: 0 /100 WBCS — SIGNIFICANT CHANGE UP (ref 0–0)
PLATELET # BLD AUTO: 414 K/UL — HIGH (ref 150–400)
POTASSIUM SERPL-MCNC: 4 MMOL/L — SIGNIFICANT CHANGE UP (ref 3.5–5.3)
POTASSIUM SERPL-SCNC: 4 MMOL/L — SIGNIFICANT CHANGE UP (ref 3.5–5.3)
PROT SERPL-MCNC: 7.6 GM/DL — SIGNIFICANT CHANGE UP (ref 6–8.3)
RBC # BLD: 4.45 M/UL — SIGNIFICANT CHANGE UP (ref 4.2–5.8)
RBC # FLD: 12.9 % — SIGNIFICANT CHANGE UP (ref 10.3–14.5)
SODIUM SERPL-SCNC: 143 MMOL/L — SIGNIFICANT CHANGE UP (ref 135–145)
WBC # BLD: 9.9 K/UL — SIGNIFICANT CHANGE UP (ref 3.8–10.5)
WBC # FLD AUTO: 9.9 K/UL — SIGNIFICANT CHANGE UP (ref 3.8–10.5)

## 2019-06-12 RX ADMIN — Medication 4: at 12:05

## 2019-06-12 RX ADMIN — Medication 250 MILLIGRAM(S): at 05:52

## 2019-06-12 RX ADMIN — Medication 250 MILLIGRAM(S): at 21:49

## 2019-06-12 RX ADMIN — Medication 100 MILLIGRAM(S): at 05:51

## 2019-06-12 RX ADMIN — SENNA PLUS 2 TABLET(S): 8.6 TABLET ORAL at 21:47

## 2019-06-12 RX ADMIN — ENOXAPARIN SODIUM 40 MILLIGRAM(S): 100 INJECTION SUBCUTANEOUS at 20:32

## 2019-06-12 RX ADMIN — Medication 8: at 08:22

## 2019-06-12 RX ADMIN — Medication 81 MILLIGRAM(S): at 12:06

## 2019-06-12 RX ADMIN — SIMVASTATIN 40 MILLIGRAM(S): 20 TABLET, FILM COATED ORAL at 21:47

## 2019-06-12 RX ADMIN — Medication 250 MILLIGRAM(S): at 13:39

## 2019-06-12 RX ADMIN — Medication 4: at 16:37

## 2019-06-12 RX ADMIN — Medication 100 MILLIGRAM(S): at 18:16

## 2019-06-12 RX ADMIN — PANTOPRAZOLE SODIUM 40 MILLIGRAM(S): 20 TABLET, DELAYED RELEASE ORAL at 06:07

## 2019-06-12 NOTE — PROGRESS NOTE ADULT - ASSESSMENT
Subjective Complaints:  Historian:             Vital Signs Last 24 Hrs  T(C): 37.6 (12 Jun 2019 16:22), Max: 37.6 (12 Jun 2019 16:22)  T(F): 99.6 (12 Jun 2019 16:22), Max: 99.6 (12 Jun 2019 16:22)  HR: 108 (12 Jun 2019 16:22) (103 - 108)  BP: 102/83 (12 Jun 2019 16:22) (102/83 - 134/81)  BP(mean): --  RR: 18 (12 Jun 2019 16:22) (16 - 18)  SpO2: 96% (12 Jun 2019 16:22) (96% - 97%)    GENERAL PHYSICAL EXAM:  General:  Appears stated age, well-groomed, well-nourished, no distress  HEENT:  NC/AT, patent nares w/ pink mucosa, OP clear w/o lesions, PERRL, EOMI, conjunctivae clear, no thyromegaly, nodules, adenopathy, no JVD  Chest:  Full & symmetric excursion, no increased effort, breath sounds clear  Cardiovascular:  Regular rhythm, S1, S2, no murmur/rub/S3/S4, no carotid/femoral/abdominal bruit, radial/pedal pulses 2+, no edema  Abdomen:  Soft, non-tender, non-distended, normoactive bowel sounds, no HSM  Extremities:  Gait & station:   Digits:   Nails:   Joints, Bones, Muscles:   ROM:   Stability:  Skin:  No rash/erythema/ecchymoses/petechiae/wounds/abscess/warm/dry  Musculoskeletal:  Full ROM in all joints w/o swelling/tenderness/effusion        LABS:                        13.2   9.90  )-----------( 414      ( 12 Jun 2019 06:35 )             39.1     06-12    143  |  108  |  48<H>  ----------------------------<  284<H>  4.0   |  23  |  1.53<H>    Ca    9.4      12 Jun 2019 06:35    TPro  7.6  /  Alb  3.2<L>  /  TBili  0.8  /  DBili  x   /  AST  45<H>  /  ALT  20  /  AlkPhos  138<H>  06-12          RADIOLOGY & ADDITIONAL STUDIES:        Neurology Progress Note:      Mental Status: letahrgic arousable open eyesd does not follws commands old cva       Cranial Nerves: deferd       Motor:  left arm contracted quadroparesis         Sensory: responds to pain       Cerebellar:  deferd       Gait: deferd       Assesment/Plan: old cva quadroparesis not eating for peg  poor memory  for sub acute rehab no seizure

## 2019-06-12 NOTE — PROGRESS NOTE ADULT - SUBJECTIVE AND OBJECTIVE BOX
INTERVAL HPI/OVERNIGHT EVENTS:        REVIEW OF SYSTEMS:  CONSTITUTIONAL: alert  comfortable poorly  communicative      MEDICATION:  acetaminophen    Suspension .. 650 milliGRAM(s) Oral every 6 hours PRN  amLODIPine   Tablet 10 milliGRAM(s) Oral daily  aspirin enteric coated 81 milliGRAM(s) Oral daily  dextrose 40% Gel 15 Gram(s) Oral once PRN  dextrose 5%. 1000 milliLiter(s) IV Continuous <Continuous>  dextrose 50% Injectable 12.5 Gram(s) IV Push once  docusate sodium Liquid 100 milliGRAM(s) Oral two times a day  enoxaparin Injectable 40 milliGRAM(s) SubCutaneous every 24 hours  glucagon  Injectable 1 milliGRAM(s) IntraMuscular once PRN  hydrALAZINE Injectable 10 milliGRAM(s) IV Push every 4 hours PRN  insulin lispro (HumaLOG) corrective regimen sliding scale   SubCutaneous three times a day before meals  insulin lispro (HumaLOG) corrective regimen sliding scale   SubCutaneous at bedtime  losartan 100 milliGRAM(s) Oral daily  metoprolol tartrate Injectable 5 milliGRAM(s) IV Push every 6 hours PRN  pantoprazole   Suspension 40 milliGRAM(s) Oral before breakfast  polyethylene glycol 3350 17 Gram(s) Oral daily PRN  senna 2 Tablet(s) Oral at bedtime  simvastatin 40 milliGRAM(s) Oral at bedtime  sodium chloride 0.45%. 1000 milliLiter(s) IV Continuous <Continuous>  valproic  acid Syrup 250 milliGRAM(s) Enteral Tube three times a day    Vital Signs Last 24 Hrs  T(C): 36.9 (12 Jun 2019 04:54), Max: 38.6 (11 Jun 2019 11:08)  T(F): 98.4 (12 Jun 2019 04:54), Max: 101.5 (11 Jun 2019 11:08)  HR: 106 (12 Jun 2019 04:54) (94 - 135)  BP: 105/53 (12 Jun 2019 04:54) (105/53 - 189/109)  BP(mean): --  RR: 17 (12 Jun 2019 04:54) (17 - 19)  SpO2: 97% (12 Jun 2019 04:54) (97% - 99%)    PHYSICAL EXAM:  GENERAL: NAD, well-groomed, well-developed  EYES:  conjunctiva and sclera clear  ENMT:  Moist mucous membranes,   NECK: Supple, No JVD, Normal thyroid  NERVOUS SYSTEM:  Alert contracture  l  hemiplegia  CHEST/LUNG: Clear    HEART: Regular rate and rhythm; No murmurs, rubs, or gallops  ABDOMEN: Soft, Nontender, Nondistended; Bowel sounds present  EXTREMITIES:  no  edema no  tenderness  SKIN: No rashes   LABS:                        13.2   9.90  )-----------( 414      ( 12 Jun 2019 06:35 )             39.1     06-12    143  |  108  |  48<H>  ----------------------------<  284<H>  4.0   |  23  |  1.53<H>    Ca    9.4      12 Jun 2019 06:35    TPro  7.6  /  Alb  3.2<L>  /  TBili  0.8  /  DBili  x   /  AST  45<H>  /  ALT  20  /  AlkPhos  138<H>  06-12        CAPILLARY BLOOD GLUCOSE      POCT Blood Glucose.: 186 mg/dL (11 Jun 2019 21:51)  POCT Blood Glucose.: 175 mg/dL (11 Jun 2019 16:15)  POCT Blood Glucose.: 210 mg/dL (11 Jun 2019 11:27)      RADIOLOGY & ADDITIONAL TESTS:    Imaging reports  Personally Reviewed:  [x ] YES  [ ] NO    Consultant(s) Notes Reviewed:  [x ] YES  [ ] NO    Care Discussed with Consultants/Other Providers [x ] YES  [ ] NO  Problem/Plan - 1:  ·  Problem: Cerebrovascular accident (CVA), unspecified mechanism.  Plan:  discussed  anabel wright's  wife  and  niece  who is  a PA  family  thinking  about  PEG  placement  will  let us  know  today.  wants  to  take    home  with or  without  peg    Problem/Plan - 2:  ·  Problem: Hypercholesteremia.     Problem/Plan - 3:  ·  Problem: Essential hypertension. losartan  metoprolol    Problem/Plan - 4:  ·  Problem: Diabetes. insulin  coverage     Problem/Plan - 5:  ·  Problem: Severe protein-calorie malnutrition. ngt  wife  will  decide  on  GT  placement    Problem/Plan - 6:  Problem: History of CVA (cerebrovascular accident). asa  statin    Problem/Plan - 7:  ·  Problem: Hemiparesis affecting left side as late effect of stroke. as  above    Problem/Plan - 8:  ·  Problem: Need for hepatitis C screening test.     Problem/Plan - 9:  ·  Problem: Chronic diastolic (congestive) heart failure.

## 2019-06-13 ENCOUNTER — TRANSCRIPTION ENCOUNTER (OUTPATIENT)
Age: 69
End: 2019-06-13

## 2019-06-13 LAB
ANION GAP SERPL CALC-SCNC: 12 MMOL/L — SIGNIFICANT CHANGE UP (ref 5–17)
BUN SERPL-MCNC: 61 MG/DL — HIGH (ref 7–23)
CALCIUM SERPL-MCNC: 8.9 MG/DL — SIGNIFICANT CHANGE UP (ref 8.5–10.1)
CHLORIDE SERPL-SCNC: 110 MMOL/L — HIGH (ref 96–108)
CO2 SERPL-SCNC: 26 MMOL/L — SIGNIFICANT CHANGE UP (ref 22–31)
CREAT SERPL-MCNC: 1.27 MG/DL — SIGNIFICANT CHANGE UP (ref 0.5–1.3)
GLUCOSE BLDC GLUCOMTR-MCNC: 166 MG/DL — HIGH (ref 70–99)
GLUCOSE BLDC GLUCOMTR-MCNC: 202 MG/DL — HIGH (ref 70–99)
GLUCOSE BLDC GLUCOMTR-MCNC: 212 MG/DL — HIGH (ref 70–99)
GLUCOSE BLDC GLUCOMTR-MCNC: 287 MG/DL — HIGH (ref 70–99)
GLUCOSE SERPL-MCNC: 253 MG/DL — HIGH (ref 70–99)
HCT VFR BLD CALC: 39.2 % — SIGNIFICANT CHANGE UP (ref 39–50)
HGB BLD-MCNC: 13 G/DL — SIGNIFICANT CHANGE UP (ref 13–17)
MCHC RBC-ENTMCNC: 29.5 PG — SIGNIFICANT CHANGE UP (ref 27–34)
MCHC RBC-ENTMCNC: 33.2 GM/DL — SIGNIFICANT CHANGE UP (ref 32–36)
MCV RBC AUTO: 89.1 FL — SIGNIFICANT CHANGE UP (ref 80–100)
NRBC # BLD: 0 /100 WBCS — SIGNIFICANT CHANGE UP (ref 0–0)
PLATELET # BLD AUTO: 368 K/UL — SIGNIFICANT CHANGE UP (ref 150–400)
POTASSIUM SERPL-MCNC: 3.9 MMOL/L — SIGNIFICANT CHANGE UP (ref 3.5–5.3)
POTASSIUM SERPL-SCNC: 3.9 MMOL/L — SIGNIFICANT CHANGE UP (ref 3.5–5.3)
RBC # BLD: 4.4 M/UL — SIGNIFICANT CHANGE UP (ref 4.2–5.8)
RBC # FLD: 12.9 % — SIGNIFICANT CHANGE UP (ref 10.3–14.5)
SODIUM SERPL-SCNC: 148 MMOL/L — HIGH (ref 135–145)
WBC # BLD: 9.38 K/UL — SIGNIFICANT CHANGE UP (ref 3.8–10.5)
WBC # FLD AUTO: 9.38 K/UL — SIGNIFICANT CHANGE UP (ref 3.8–10.5)

## 2019-06-13 RX ORDER — TRAZODONE HCL 50 MG
1 TABLET ORAL
Qty: 0 | Refills: 0 | DISCHARGE

## 2019-06-13 RX ORDER — TRAZODONE HCL 50 MG
0 TABLET ORAL
Qty: 0 | Refills: 0 | DISCHARGE

## 2019-06-13 RX ORDER — MORPHINE SULFATE 50 MG/1
2.5 CAPSULE, EXTENDED RELEASE ORAL
Qty: 100 | Refills: 0
Start: 2019-06-13 | End: 2019-06-19

## 2019-06-13 RX ORDER — ASPIRIN/CALCIUM CARB/MAGNESIUM 324 MG
1 TABLET ORAL
Qty: 0 | Refills: 0 | DISCHARGE

## 2019-06-13 RX ORDER — ACETAMINOPHEN 500 MG
650 TABLET ORAL EVERY 6 HOURS
Refills: 0 | Status: DISCONTINUED | OUTPATIENT
Start: 2019-06-13 | End: 2019-06-18

## 2019-06-13 RX ADMIN — Medication 100 MILLIGRAM(S): at 17:30

## 2019-06-13 RX ADMIN — LOSARTAN POTASSIUM 100 MILLIGRAM(S): 100 TABLET, FILM COATED ORAL at 05:17

## 2019-06-13 RX ADMIN — Medication 650 MILLIGRAM(S): at 17:30

## 2019-06-13 RX ADMIN — Medication 250 MILLIGRAM(S): at 13:56

## 2019-06-13 RX ADMIN — Medication 2: at 11:20

## 2019-06-13 RX ADMIN — SIMVASTATIN 40 MILLIGRAM(S): 20 TABLET, FILM COATED ORAL at 21:00

## 2019-06-13 RX ADMIN — Medication 250 MILLIGRAM(S): at 21:02

## 2019-06-13 RX ADMIN — SENNA PLUS 2 TABLET(S): 8.6 TABLET ORAL at 21:00

## 2019-06-13 RX ADMIN — Medication 250 MILLIGRAM(S): at 05:17

## 2019-06-13 RX ADMIN — AMLODIPINE BESYLATE 10 MILLIGRAM(S): 2.5 TABLET ORAL at 05:18

## 2019-06-13 RX ADMIN — Medication 100 MILLIGRAM(S): at 05:16

## 2019-06-13 RX ADMIN — Medication 4: at 17:30

## 2019-06-13 RX ADMIN — PANTOPRAZOLE SODIUM 40 MILLIGRAM(S): 20 TABLET, DELAYED RELEASE ORAL at 08:17

## 2019-06-13 RX ADMIN — Medication 6: at 08:17

## 2019-06-13 RX ADMIN — Medication 650 MILLIGRAM(S): at 18:04

## 2019-06-13 RX ADMIN — Medication 81 MILLIGRAM(S): at 11:20

## 2019-06-13 RX ADMIN — Medication 650 MILLIGRAM(S): at 18:43

## 2019-06-13 RX ADMIN — ENOXAPARIN SODIUM 40 MILLIGRAM(S): 100 INJECTION SUBCUTANEOUS at 21:00

## 2019-06-13 NOTE — PROGRESS NOTE ADULT - ASSESSMENT
Subjective Complaints:  Historian:             Vital Signs Last 24 Hrs  T(C): 37.5 (13 Jun 2019 18:43), Max: 38.2 (13 Jun 2019 16:36)  T(F): 99.5 (13 Jun 2019 18:43), Max: 100.8 (13 Jun 2019 16:36)  HR: 130 (13 Jun 2019 16:36) (113 - 130)  BP: 153/94 (13 Jun 2019 16:36) (106/59 - 160/102)  BP(mean): --  RR: 19 (13 Jun 2019 16:36) (18 - 19)  SpO2: 96% (13 Jun 2019 16:36) (96% - 96%)    GENERAL PHYSICAL EXAM:  General:  Appears stated age, well-groomed, well-nourished, no distress  HEENT:  NC/AT, patent nares w/ pink mucosa, OP clear w/o lesions, PERRL, EOMI, conjunctivae clear, no thyromegaly, nodules, adenopathy, no JVD  Chest:  Full & symmetric excursion, no increased effort, breath sounds clear  Cardiovascular:  Regular rhythm, S1, S2, no murmur/rub/S3/S4, no carotid/femoral/abdominal bruit, radial/pedal pulses 2+, no edema  Abdomen:  Soft, non-tender, non-distended, normoactive bowel sounds, no HSM  Extremities:  Gait & station:   Digits:   Nails:   Joints, Bones, Muscles:   ROM:   Stability:  Skin:  No rash/erythema/ecchymoses/petechiae/wounds/abscess/warm/dry  Musculoskeletal:  Full ROM in all joints w/o swelling/tenderness/effusion        LABS:                        13.0   9.38  )-----------( 368      ( 13 Jun 2019 07:34 )             39.2     06-13    148<H>  |  110<H>  |  61<H>  ----------------------------<  253<H>  3.9   |  26  |  1.27    Ca    8.9      13 Jun 2019 07:34    TPro  7.6  /  Alb  3.2<L>  /  TBili  0.8  /  DBili  x   /  AST  45<H>  /  ALT  20  /  AlkPhos  138<H>  06-12          RADIOLOGY & ADDITIONAL STUDIES:        Neurology Progress Note:      Mental Status: letahrgic arousable open eyes does not follws commands old cva       Cranial Nerves: face equal       Motor:   quadrop[aresis         Sensory: intact to pain       Cerebellar:  deferd       Gait: unable to walk       Assesment/Plan: old cva encephalaopthy  poor memory  for sub acute rehjab  quadroparesis

## 2019-06-13 NOTE — PROGRESS NOTE ADULT - SUBJECTIVE AND OBJECTIVE BOX
INTERVAL HPI/OVERNIGHT EVENTS:    patient  reported  with  ability  to  swallow  puree  thickened  liquids  6/12/19  unable  to  marcel  this  AM    REVIEW OF SYSTEMS:  CONSTITUTIONAL:  somnolent  poorly  arouseable      MEDICATION:  acetaminophen    Suspension .. 650 milliGRAM(s) Oral every 6 hours PRN  amLODIPine   Tablet 10 milliGRAM(s) Oral daily  aspirin enteric coated 81 milliGRAM(s) Oral daily  dextrose 40% Gel 15 Gram(s) Oral once PRN  dextrose 5%. 1000 milliLiter(s) IV Continuous <Continuous>  dextrose 50% Injectable 12.5 Gram(s) IV Push once  docusate sodium Liquid 100 milliGRAM(s) Oral two times a day  enoxaparin Injectable 40 milliGRAM(s) SubCutaneous every 24 hours  glucagon  Injectable 1 milliGRAM(s) IntraMuscular once PRN  hydrALAZINE Injectable 10 milliGRAM(s) IV Push every 4 hours PRN  insulin lispro (HumaLOG) corrective regimen sliding scale   SubCutaneous three times a day before meals  insulin lispro (HumaLOG) corrective regimen sliding scale   SubCutaneous at bedtime  losartan 100 milliGRAM(s) Oral daily  metoprolol tartrate Injectable 5 milliGRAM(s) IV Push every 6 hours PRN  pantoprazole   Suspension 40 milliGRAM(s) Oral before breakfast  polyethylene glycol 3350 17 Gram(s) Oral daily PRN  senna 2 Tablet(s) Oral at bedtime  simvastatin 40 milliGRAM(s) Oral at bedtime  sodium chloride 0.45%. 1000 milliLiter(s) IV Continuous <Continuous>  valproic  acid Syrup 250 milliGRAM(s) Enteral Tube three times a day    Vital Signs Last 24 Hrs  T(C): 37.1 (13 Jun 2019 04:47), Max: 37.6 (12 Jun 2019 16:22)  T(F): 98.8 (13 Jun 2019 04:47), Max: 99.6 (12 Jun 2019 16:22)  HR: 113 (13 Jun 2019 04:47) (105 - 114)  BP: 160/102 (13 Jun 2019 04:47) (102/83 - 160/102)  BP(mean): --  RR: 18 (13 Jun 2019 04:47) (16 - 18)  SpO2: 96% (13 Jun 2019 04:47) (96% - 97%)    PHYSICAL EXAM:  GENERAL: NAD, well-groomed, well-developed  EYES:  conjunctiva and sclera clear  NECK: Supple, No JVD, Normal thyroid  NERVOUS SYSTEM:   somnolent  poorly  arouseable  CHEST/LUNG: Clear                          13.0   9.38  )-----------( 368      ( 13 Jun 2019 07:34 )             39.2     06-13    148<H>  |  110<H>  |  61<H>  ----------------------------<  253<H>  3.9   |  26  |  1.27    Ca    8.9      13 Jun 2019 07:34    TPro  7.6  /  Alb  3.2<L>  /  TBili  0.8  /  DBili  x   /  AST  45<H>  /  ALT  20  /  AlkPhos  138<H>  06-12        CAPILLARY BLOOD GLUCOSE      POCT Blood Glucose.: 287 mg/dL (13 Jun 2019 07:51)  POCT Blood Glucose.: 157 mg/dL (12 Jun 2019 21:54)  POCT Blood Glucose.: 208 mg/dL (12 Jun 2019 15:55)  POCT Blood Glucose.: 206 mg/dL (12 Jun 2019 12:05)      RADIOLOGY & ADDITIONAL TESTS:    Imaging reports  Personally Reviewed:  [ x] YES  [ ] NO    Consultant(s) Notes Reviewed:  [ x] YES  [ ] NO    Care Discussed with Consultants/Other Providers [x ] YES  [ ] NO  Problem/Plan - 1:  ·  Problem: Cerebrovascular accident (CVA), unspecified mechanism.  Plan:  discussed  anabel  pt's  wife  and  nephew  at  bedside  does NOT  want  PEG  wants  to  take  patient home  with  hospice     Problem/Plan - 2:  ·  Problem: Hypercholesteremia.     Problem/Plan - 3:  ·  Problem: Essential hypertension. losartan  metoprolol    Problem/Plan - 4:  ·  Problem: Diabetes. insulin  coverage     Problem/Plan - 5:  ·  Problem: Severe protein-calorie malnutrition. ngt  wife  will  decide  on  GT  placement    Problem/Plan - 6:  Problem: History of CVA (cerebrovascular accident). asa  statin    Problem/Plan - 7:  ·  Problem: Hemiparesis affecting left side as late effect of stroke. as  above    Problem/Plan - 8:  ·  Problem: Need for hepatitis C screening test.     Problem/Plan - 9:  ·  Problem: Chronic diastolic (congestive) heart failure.   for  home hospice  evaluation

## 2019-06-13 NOTE — DISCHARGE NOTE PROVIDER - HOSPITAL COURSE
patient  monitored  on  telemetry  nutrition  meds  administered  via  NGT  discussed  with  family  do not  want  tube feedings  request  hospice  evalauation  and  home  discharge    repeat  S/S eval  suggests  puree thickened  liquids  patient  unable  to  do  this  AM  will  call hospice  evaluation patient  monitored  on  telemetry  nutrition  meds  administered  via  NGT  discussed  with  family  do not  want  tube feedings  request  hospice  evalauation  and  home  discharge    repeat  S/S eval  suggests  puree thickened  liquids  patient  unable  to  do  this  AM  will  call hospice  evaluation. Hospice accepted for in patient hospice care. Patient is discharged to in patient hospice.

## 2019-06-13 NOTE — DISCHARGE NOTE PROVIDER - NSDCCPCAREPLAN_GEN_ALL_CORE_FT
PRINCIPAL DISCHARGE DIAGNOSIS  Diagnosis: Cerebrovascular accident (CVA), unspecified mechanism  Assessment and Plan of Treatment:       SECONDARY DISCHARGE DIAGNOSES  Diagnosis: Dysphagia  Assessment and Plan of Treatment:     Diagnosis: Multi-infarct dementia  Assessment and Plan of Treatment:

## 2019-06-13 NOTE — DISCHARGE NOTE PROVIDER - NSDCQMMRS_NEU_ALL_CORE
4 - Moderately severe disability. Unable to own bodily needs without assistance and unable to walk unassisted 0 - No symptoms

## 2019-06-14 LAB
ANION GAP SERPL CALC-SCNC: 13 MMOL/L — SIGNIFICANT CHANGE UP (ref 5–17)
BUN SERPL-MCNC: 71 MG/DL — HIGH (ref 7–23)
CALCIUM SERPL-MCNC: 9.6 MG/DL — SIGNIFICANT CHANGE UP (ref 8.5–10.1)
CHLORIDE SERPL-SCNC: 112 MMOL/L — HIGH (ref 96–108)
CO2 SERPL-SCNC: 24 MMOL/L — SIGNIFICANT CHANGE UP (ref 22–31)
CREAT SERPL-MCNC: 1.34 MG/DL — HIGH (ref 0.5–1.3)
GLUCOSE BLDC GLUCOMTR-MCNC: 192 MG/DL — HIGH (ref 70–99)
GLUCOSE BLDC GLUCOMTR-MCNC: 207 MG/DL — HIGH (ref 70–99)
GLUCOSE BLDC GLUCOMTR-MCNC: 234 MG/DL — HIGH (ref 70–99)
GLUCOSE BLDC GLUCOMTR-MCNC: 254 MG/DL — HIGH (ref 70–99)
GLUCOSE SERPL-MCNC: 270 MG/DL — HIGH (ref 70–99)
HCT VFR BLD CALC: 41.4 % — SIGNIFICANT CHANGE UP (ref 39–50)
HGB BLD-MCNC: 13.3 G/DL — SIGNIFICANT CHANGE UP (ref 13–17)
MCHC RBC-ENTMCNC: 29.4 PG — SIGNIFICANT CHANGE UP (ref 27–34)
MCHC RBC-ENTMCNC: 32.1 GM/DL — SIGNIFICANT CHANGE UP (ref 32–36)
MCV RBC AUTO: 91.4 FL — SIGNIFICANT CHANGE UP (ref 80–100)
NRBC # BLD: 0 /100 WBCS — SIGNIFICANT CHANGE UP (ref 0–0)
PLATELET # BLD AUTO: 423 K/UL — HIGH (ref 150–400)
POTASSIUM SERPL-MCNC: 3.8 MMOL/L — SIGNIFICANT CHANGE UP (ref 3.5–5.3)
POTASSIUM SERPL-SCNC: 3.8 MMOL/L — SIGNIFICANT CHANGE UP (ref 3.5–5.3)
RBC # BLD: 4.53 M/UL — SIGNIFICANT CHANGE UP (ref 4.2–5.8)
RBC # FLD: 13.1 % — SIGNIFICANT CHANGE UP (ref 10.3–14.5)
SODIUM SERPL-SCNC: 149 MMOL/L — HIGH (ref 135–145)
WBC # BLD: 8.45 K/UL — SIGNIFICANT CHANGE UP (ref 3.8–10.5)
WBC # FLD AUTO: 8.45 K/UL — SIGNIFICANT CHANGE UP (ref 3.8–10.5)

## 2019-06-14 RX ORDER — SODIUM CHLORIDE 9 MG/ML
1000 INJECTION INTRAMUSCULAR; INTRAVENOUS; SUBCUTANEOUS
Refills: 0 | Status: DISCONTINUED | OUTPATIENT
Start: 2019-06-14 | End: 2019-06-18

## 2019-06-14 RX ADMIN — Medication 4: at 16:49

## 2019-06-14 RX ADMIN — AMLODIPINE BESYLATE 10 MILLIGRAM(S): 2.5 TABLET ORAL at 06:41

## 2019-06-14 RX ADMIN — Medication 6: at 07:50

## 2019-06-14 RX ADMIN — ENOXAPARIN SODIUM 40 MILLIGRAM(S): 100 INJECTION SUBCUTANEOUS at 21:41

## 2019-06-14 RX ADMIN — Medication 100 MILLIGRAM(S): at 06:41

## 2019-06-14 RX ADMIN — PANTOPRAZOLE SODIUM 40 MILLIGRAM(S): 20 TABLET, DELAYED RELEASE ORAL at 06:41

## 2019-06-14 RX ADMIN — SODIUM CHLORIDE 70 MILLILITER(S): 9 INJECTION INTRAMUSCULAR; INTRAVENOUS; SUBCUTANEOUS at 16:51

## 2019-06-14 RX ADMIN — LOSARTAN POTASSIUM 100 MILLIGRAM(S): 100 TABLET, FILM COATED ORAL at 06:41

## 2019-06-14 RX ADMIN — Medication 250 MILLIGRAM(S): at 06:41

## 2019-06-14 RX ADMIN — Medication 4: at 11:25

## 2019-06-14 RX ADMIN — Medication 81 MILLIGRAM(S): at 11:26

## 2019-06-14 NOTE — CHART NOTE - NSCHARTNOTEFT_GEN_A_CORE
Assessment:   Pt adm c CVA, c hx of DM(on metformin PTA), HTN, CVA, hemiparesis, chronic heart failure, s/p enteral feeding therapy 06/20-06/12/19 via NGT.  MOLST form in Chart, no tube feeding noted.  Pt is DNR.    Factors impacting intake: [ ] none [ ] nausea  [ ] vomiting [ ] diarrhea [ ] constipation  [ ]chewing problems [x ] swallowing issues, 06/11, swallow evaluation recommended puree c honey thick liquids  [ x] other: persistent lack of appetite     Diet Prescription: Diet, Dysphagia 1 Pureed-Honey Consistency Fluid:   DASH/TLC {Sodium & Cholesterol Restricted} (06-12-19 @ 14:25)    Intake: 0% oral intake 06/13, unconsumed breakfast noted today     Current Weight: 06/14/19, 60.6 kg, 06/09/19, c wt. gain of 0.7 kg  % Weight Change: .01%  No edema noted     Pertinent Medications: MEDICATIONS  (STANDING):  amLODIPine   Tablet 10 milliGRAM(s) Oral daily  aspirin enteric coated 81 milliGRAM(s) Oral daily  dextrose 5%. 1000 milliLiter(s) (50 mL/Hr) IV Continuous <Continuous>  dextrose 50% Injectable 12.5 Gram(s) IV Push once  docusate sodium Liquid 100 milliGRAM(s) Oral two times a day  enoxaparin Injectable 40 milliGRAM(s) SubCutaneous every 24 hours  insulin lispro (HumaLOG) corrective regimen sliding scale   SubCutaneous three times a day before meals  insulin lispro (HumaLOG) corrective regimen sliding scale   SubCutaneous at bedtime  losartan 100 milliGRAM(s) Oral daily  pantoprazole   Suspension 40 milliGRAM(s) Oral before breakfast  senna 2 Tablet(s) Oral at bedtime  simvastatin 40 milliGRAM(s) Oral at bedtime  sodium chloride 0.45%. 1000 milliLiter(s) (60 mL/Hr) IV Continuous <Continuous>  valproic  acid Syrup 250 milliGRAM(s) Enteral Tube three times a day    MEDICATIONS  (PRN):  acetaminophen  Suppository .. 650 milliGRAM(s) Rectal every 6 hours PRN Temp greater or equal to 38C (100.4F), Mild Pain (1 - 3)  dextrose 40% Gel 15 Gram(s) Oral once PRN Blood Glucose LESS THAN 70 milliGRAM(s)/deciliter  glucagon  Injectable 1 milliGRAM(s) IntraMuscular once PRN Glucose LESS THAN 70 milligrams/deciliter  hydrALAZINE Injectable 10 milliGRAM(s) IV Push every 4 hours PRN sbp > 165  metoprolol tartrate Injectable 5 milliGRAM(s) IV Push every 6 hours PRN SBP > 165 bpm, hold if HR < 65  polyethylene glycol 3350 17 Gram(s) Oral daily PRN Constipation    Pertinent Labs: 06-14 Na149 mmol/L<H> Glu 270 mg/dL<H> K+ 3.8 mmol/L Cr  1.34 mg/dL<H> BUN 71 mg/dL<H> 06-12 Alb 3.2 g/dL<L> 06-11 [id number] 6.8 %<H> 06-10 Chol 147 mg/dL LDL 79 mg/dL HDL 33 mg/dL<L> Trig 175 mg/dL<H>     CAPILLARY BLOOD GLUCOSE      POCT Blood Glucose.: 254 mg/dL (14 Jun 2019 07:18)  POCT Blood Glucose.: 202 mg/dL (13 Jun 2019 21:42)  POCT Blood Glucose.: 212 mg/dL (13 Jun 2019 16:46)  POCT Blood Glucose.: 166 mg/dL (13 Jun 2019 11:18)    Skin: w/o pressure ulcers    Estimated Needs:   [ x] no change since previous assessment( 06/10/19)  [ ] recalculated:     Previous Nutrition Diagnosis:   · Nutrition Diagnostic Terminology #1: Nutrient  · Nutrient: Malnutrition; Severe Malnutrition in the context of chronic illness  · Etiology: inadequate protein-energy intake in the context of history of CVA, cognitive limitations  · Signs/Symptoms: 22%(27#)loss x 1 year; severe muscle wasting & subq fat loss, <75% protein-energy needs met x1 year    · Goal/Expected Outcome: Pt to meet >75% of protein-energy needs via TF(tube feeding); gradual weight gain 1-2#/week; not applicable due to current plan of care for no tube feeding   New Goal: oral intake as tolerated     Nutrition Diagnosis is [x ] ongoing  [ ] resolved [  ] not applicable       New Nutrition Diagnosis: [x ] not applicable       Interventions:   Recommend  [x ] Change Diet To: Dysphagia 1 Pureed- Honey Consistency Fluids Low sodium , consistent carbohydrate c evening snack   [x] Nutrition Supplement; Recommend Ensure Pudding 4 oz 3x/day (510 arpan, 12 gm pro), will add no sugar added magic cup 4 oz 2 x day= 520 calories, 18 grams protein, vanilla yogurt 2 x day  [ ] Nutrition Support  [ ] Other:     Monitoring and Evaluation:   [x ] PO intake [ x ] Tolerance to diet prescription [ x ] weights [ x ] labs, glucose [ x ] follow up per protocol  [ ] other:
Hospitalist Medicine PA    Called by RN for NG tube insertion; was initially placed this afternoon @ 13:44. Per RN was pulled by patient sometime prior to beginning night shift @ 19:00.  NG tube order noted on chart.  Pt is a 69y old Male admitted for CVA. +L hemiparesis with LUE contracture. +Fidgeting with RUE noted.     Vital Signs Last 24 Hrs  T(C): 37.5 (10 Josr 2019 16:30), Max: 37.5 (10 Josr 2019 16:30)  T(F): 99.5 (10 Josr 2019 16:30), Max: 99.5 (10 Josr 2019 16:30)  HR: 95 (10 Josr 2019 16:30) (95 - 125)  BP: 142/74 (10 Josr 2019 16:30) (142/74 - 156/82)  RR: 17 (10 Josr 2019 16:30) (16 - 18)  SpO2: 96% (10 Josr 2019 16:30) (95% - 97%)      NGT inserted into R nare. Pt tolerated procedure well. Placement verified via auscultation. Abd xray ordered to confirm placement.  Non-tethered mitten of right hand ordered to prevent accidental removal again.
Upon Nutritional Assessment by the Registered Dietitian your patient was determined to meet criteria / has evidence of the following diagnosis/diagnoses:          [ ]  Mild Protein Calorie Malnutrition        [ ]  Moderate Protein Calorie Malnutrition        [ x ] Severe Protein Calorie Malnutrition- in the context of chronic illness         [ ] Unspecified Protein Calorie Malnutrition        [x ] Underweight / BMI <19        [ ] Morbid Obesity / BMI > 40      Findings as based on:  •  Comprehensive nutrition assessment and consultation  •  Calorie counts (nutrient intake analysis)  •  Food acceptance and intake status from observations by staff  •  Follow up  •  Patient education  •  Intervention secondary to interdisciplinary rounds  •   concerns      Treatment:    The following diet has been recommended:  Please consider adjusting tube feed to better meet pt RDI's as follows:   Glucerna 1.5; total volume 1680mL/24 hrs  Start at 30 mL increase to by 10mL q8hr   Until goal rate of 70mL/hr is reached.   (Provides:  2520kcal, 137 grams of protein; 1280mL free h2o)   + 200 mL free H2O flush q6hr      PROVIDER Section:     By signing this assessment you are acknowledging and agree with the diagnosis/diagnoses assigned by the Registered Dietitian    Comments:
Hospitalist Medicine PA    Called by RN for NG tube insertion. Pt is a 69y old Male admitted for CVA. NG tube order noted on chart.    T(C): 36.6 (06-10-19 @ 12:00), Max: 37.8 (06-09-19 @ 20:51)  T(F): 97.8 (06-10-19 @ 12:00), Max: 100 (06-09-19 @ 20:51)  HR: 104 (06-10-19 @ 12:00) (81 - 125)  BP: 145/92 (06-10-19 @ 12:00) (127/78 - 188/102)  RR: 16 (06-10-19 @ 12:00) (13 - 20)  SpO2: 97% (06-10-19 @ 12:00) (95% - 98%)  Wt(kg): --    NGT inserted into R nare. Pt tolerated procedure well. Placement verified via auscultation. KUB ordered to confirm placement.

## 2019-06-14 NOTE — PROGRESS NOTE ADULT - ASSESSMENT
Subjective Complaints:  Historian:             Vital Signs Last 24 Hrs  T(C): 37.9 (14 Jun 2019 11:35), Max: 38.2 (13 Jun 2019 16:36)  T(F): 100.2 (14 Jun 2019 11:35), Max: 100.8 (13 Jun 2019 16:36)  HR: 116 (14 Jun 2019 11:35) (110 - 130)  BP: 152/87 (14 Jun 2019 11:35) (152/87 - 155/95)  BP(mean): --  RR: 17 (14 Jun 2019 04:39) (17 - 19)  SpO2: 96% (14 Jun 2019 11:35) (95% - 96%)    GENERAL PHYSICAL EXAM:  General:  Appears stated age, well-groomed, well-nourished, no distress  HEENT:  NC/AT, patent nares w/ pink mucosa, OP clear w/o lesions, PERRL, EOMI, conjunctivae clear, no thyromegaly, nodules, adenopathy, no JVD  Chest:  Full & symmetric excursion, no increased effort, breath sounds clear  Cardiovascular:  Regular rhythm, S1, S2, no murmur/rub/S3/S4, no carotid/femoral/abdominal bruit, radial/pedal pulses 2+, no edema  Abdomen:  Soft, non-tender, non-distended, normoactive bowel sounds, no HSM  Extremities:  Gait & station:   Digits:   Nails:   Joints, Bones, Muscles:   ROM:   Stability:  Skin:  No rash/erythema/ecchymoses/petechiae/wounds/abscess/warm/dry  Musculoskeletal:  Full ROM in all joints w/o swelling/tenderness/effusion        LABS:                        13.3   8.45  )-----------( 423      ( 14 Jun 2019 08:11 )             41.4     06-14    149<H>  |  112<H>  |  71<H>  ----------------------------<  270<H>  3.8   |  24  |  1.34<H>    Ca    9.6      14 Jun 2019 08:11            RADIOLOGY & ADDITIONAL STUDIES:        Neurology Progress Note:      Mental Status:  letraghrgic aroyusable tries to open eyes   does not follws commmands       Cranial Nerves: old cva left face weakness       Motor:  quadroparesis old cva         Sensory:intact to pain       Cerebellar: deferd       Gait:unable to walk       Assesment/Plan: old cva not eating well for peg  no seziure metabolic encephalaothyt

## 2019-06-14 NOTE — GOALS OF CARE CONVERSATION - PERSONAL ADVANCE DIRECTIVE - CONVERSATION DETAILS
Met with pt's spouse at bedside. She contacted her niece Bette Swan [phone] for me to speak with. Bette states that the family is interested in LTC placement. Their first choice is [facility]. Second choice is a facility on [facility]. She was unsure of the name. She states she would like to know if pt was accepted to either facility. I explained that DEVAN Gay would have more information in regards to placement and the accepting facilities. I explained that a contracted [facility]CN list was given to her aunt. She requested that a list be emailed to her for review. List has been emailed. She would appreciate a call back from DEVAN Gay but will also call herself this afternoon. Discussed case with Dr. Soto and at this time pt remains appropriate for home hospice/LTC in a facility. Does not meet In pt criteria at this time. Will cont to f/u.       Patty Shi Rn

## 2019-06-14 NOTE — PROGRESS NOTE ADULT - SUBJECTIVE AND OBJECTIVE BOX
INTERVAL HPI/OVERNIGHT EVENTS:        REVIEW OF SYSTEMS:  CONSTITUTIONAL:  no  complaints    NECK: No pain or stiffnes  RESPIRATORY: No SOB   CARDIOVASCULAR: No chest pain, palpitations, dizziness,   GASTROINTESTINAL: No abdominal pain. No nausea, vomiting,   NEUROLOGICAL: No headaches, no  blurry  vision no  dizziness  SKIN: No itching,   MUSCULOSKELETAL: No pain    MEDICATION:  acetaminophen  Suppository .. 650 milliGRAM(s) Rectal every 6 hours PRN  amLODIPine   Tablet 10 milliGRAM(s) Oral daily  aspirin enteric coated 81 milliGRAM(s) Oral daily  dextrose 40% Gel 15 Gram(s) Oral once PRN  dextrose 5%. 1000 milliLiter(s) IV Continuous <Continuous>  dextrose 50% Injectable 12.5 Gram(s) IV Push once  docusate sodium Liquid 100 milliGRAM(s) Oral two times a day  enoxaparin Injectable 40 milliGRAM(s) SubCutaneous every 24 hours  glucagon  Injectable 1 milliGRAM(s) IntraMuscular once PRN  hydrALAZINE Injectable 10 milliGRAM(s) IV Push every 4 hours PRN  insulin lispro (HumaLOG) corrective regimen sliding scale   SubCutaneous three times a day before meals  insulin lispro (HumaLOG) corrective regimen sliding scale   SubCutaneous at bedtime  losartan 100 milliGRAM(s) Oral daily  metoprolol tartrate Injectable 5 milliGRAM(s) IV Push every 6 hours PRN  pantoprazole   Suspension 40 milliGRAM(s) Oral before breakfast  polyethylene glycol 3350 17 Gram(s) Oral daily PRN  senna 2 Tablet(s) Oral at bedtime  simvastatin 40 milliGRAM(s) Oral at bedtime  sodium chloride 0.45%. 1000 milliLiter(s) IV Continuous <Continuous>  valproic  acid Syrup 250 milliGRAM(s) Enteral Tube three times a day    Vital Signs Last 24 Hrs  T(C): 37.3 (14 Jun 2019 04:39), Max: 38.2 (13 Jun 2019 16:36)  T(F): 99.2 (14 Jun 2019 04:39), Max: 100.8 (13 Jun 2019 16:36)  HR: 110 (14 Jun 2019 04:39) (110 - 130)  BP: 152/88 (14 Jun 2019 04:39) (123/83 - 155/95)  BP(mean): --  RR: 17 (14 Jun 2019 04:39) (17 - 19)  SpO2: 95% (14 Jun 2019 04:39) (95% - 96%)    PHYSICAL EXAM:  GENERAL: NAD, well-groomed, well-developed  EYES:  conjunctiva and sclera clear  ENMT:  Moist mucous membranes,   NECK: Supple, No JVD, Normal thyroid  NERVOUS SYSTEM:  Alert  aphasic  quadriparesis   CHEST/LUNG: Clear    HEART: Regular rate and rhythm; No murmurs, rubs, or gallops  ABDOMEN: Soft, Nontender, Nondistended; Bowel sounds present  EXTREMITIES:  no  edema no  tenderness  SKIN: No rashes   LABS:                        13.3   8.45  )-----------( 423      ( 14 Jun 2019 08:11 )             41.4     06-14    149<H>  |  112<H>  |  71<H>  ----------------------------<  270<H>  3.8   |  24  |  1.34<H>    Ca    9.6      14 Jun 2019 08:11          CAPILLARY BLOOD GLUCOSE      POCT Blood Glucose.: 254 mg/dL (14 Jun 2019 07:18)  POCT Blood Glucose.: 202 mg/dL (13 Jun 2019 21:42)  POCT Blood Glucose.: 212 mg/dL (13 Jun 2019 16:46)  POCT Blood Glucose.: 166 mg/dL (13 Jun 2019 11:18)      RADIOLOGY & ADDITIONAL TESTS:    Imaging reports  Personally Reviewed:  [x ] YES  [ ] NO    Consultant(s) Notes Reviewed:  [ x] YES  [ ] NO    Care Discussed with Consultants/Other Providers [x ] YES  [ ] NO  Problem/Plan - 1:  ·  Problem: Cerebrovascular accident (CVA), unspecified mechanism.  Plan:  discussed  anabel  pt's  wife  and  nephew  at  bedside  does NOT  want  PEG  wants  to  take  patient home  with  hospice     Problem/Plan - 2:  ·  Problem: Hypercholesteremia.     Problem/Plan - 3:  ·  Problem: Essential hypertension. losartan  metoprolol    Problem/Plan - 4:  ·  Problem: Diabetes. insulin  coverage     Problem/Plan - 5:  ·  Problem: Severe protein-calorie malnutrition. ngt  wife  will  decide  on  GT  placement    Problem/Plan - 6:  Problem: History of CVA (cerebrovascular accident). asa  statin    Problem/Plan - 7:  ·  Problem: Hemiparesis affecting left side as late effect of stroke. as  above    Problem/Plan - 8:  ·  Problem: Need for hepatitis C screening test.     Problem/Plan - 9:  ·  Problem: Chronic diastolic (congestive) heart failure.   for transfer to

## 2019-06-15 LAB
ANION GAP SERPL CALC-SCNC: 12 MMOL/L — SIGNIFICANT CHANGE UP (ref 5–17)
BUN SERPL-MCNC: 86 MG/DL — HIGH (ref 7–23)
CALCIUM SERPL-MCNC: 8.7 MG/DL — SIGNIFICANT CHANGE UP (ref 8.5–10.1)
CHLORIDE SERPL-SCNC: 109 MMOL/L — HIGH (ref 96–108)
CO2 SERPL-SCNC: 25 MMOL/L — SIGNIFICANT CHANGE UP (ref 22–31)
CREAT SERPL-MCNC: 2.16 MG/DL — HIGH (ref 0.5–1.3)
GLUCOSE BLDC GLUCOMTR-MCNC: 169 MG/DL — HIGH (ref 70–99)
GLUCOSE BLDC GLUCOMTR-MCNC: 193 MG/DL — HIGH (ref 70–99)
GLUCOSE BLDC GLUCOMTR-MCNC: 248 MG/DL — HIGH (ref 70–99)
GLUCOSE BLDC GLUCOMTR-MCNC: 249 MG/DL — HIGH (ref 70–99)
GLUCOSE SERPL-MCNC: 253 MG/DL — HIGH (ref 70–99)
HCT VFR BLD CALC: 44.9 % — SIGNIFICANT CHANGE UP (ref 39–50)
HGB BLD-MCNC: 13.9 G/DL — SIGNIFICANT CHANGE UP (ref 13–17)
MCHC RBC-ENTMCNC: 28.7 PG — SIGNIFICANT CHANGE UP (ref 27–34)
MCHC RBC-ENTMCNC: 31 GM/DL — LOW (ref 32–36)
MCV RBC AUTO: 92.6 FL — SIGNIFICANT CHANGE UP (ref 80–100)
NRBC # BLD: 0 /100 WBCS — SIGNIFICANT CHANGE UP (ref 0–0)
PLATELET # BLD AUTO: 422 K/UL — HIGH (ref 150–400)
POTASSIUM SERPL-MCNC: 3.7 MMOL/L — SIGNIFICANT CHANGE UP (ref 3.5–5.3)
POTASSIUM SERPL-SCNC: 3.7 MMOL/L — SIGNIFICANT CHANGE UP (ref 3.5–5.3)
RBC # BLD: 4.85 M/UL — SIGNIFICANT CHANGE UP (ref 4.2–5.8)
RBC # FLD: 13.2 % — SIGNIFICANT CHANGE UP (ref 10.3–14.5)
SODIUM SERPL-SCNC: 146 MMOL/L — HIGH (ref 135–145)
WBC # BLD: 14.26 K/UL — HIGH (ref 3.8–10.5)
WBC # FLD AUTO: 14.26 K/UL — HIGH (ref 3.8–10.5)

## 2019-06-15 RX ORDER — ACETAMINOPHEN 500 MG
650 TABLET ORAL ONCE
Refills: 0 | Status: COMPLETED | OUTPATIENT
Start: 2019-06-15 | End: 2019-06-15

## 2019-06-15 RX ORDER — ACETAMINOPHEN 500 MG
1000 TABLET ORAL ONCE
Refills: 0 | Status: COMPLETED | OUTPATIENT
Start: 2019-06-15 | End: 2019-06-15

## 2019-06-15 RX ADMIN — Medication 650 MILLIGRAM(S): at 21:38

## 2019-06-15 RX ADMIN — Medication 650 MILLIGRAM(S): at 01:59

## 2019-06-15 RX ADMIN — Medication 650 MILLIGRAM(S): at 03:00

## 2019-06-15 RX ADMIN — Medication 1000 MILLIGRAM(S): at 06:13

## 2019-06-15 RX ADMIN — Medication 4: at 11:53

## 2019-06-15 RX ADMIN — Medication 4: at 08:16

## 2019-06-15 RX ADMIN — Medication 400 MILLIGRAM(S): at 05:51

## 2019-06-15 RX ADMIN — Medication 650 MILLIGRAM(S): at 22:05

## 2019-06-15 RX ADMIN — Medication 2: at 17:25

## 2019-06-15 RX ADMIN — ENOXAPARIN SODIUM 40 MILLIGRAM(S): 100 INJECTION SUBCUTANEOUS at 20:30

## 2019-06-15 NOTE — PROGRESS NOTE ADULT - SUBJECTIVE AND OBJECTIVE BOX
INTERVAL HPI/OVERNIGHT EVENTS:  was  unable  to  be  transferred  to hospice      REVIEW OF SYSTEMS:  CONSTITUTIONAL:  alert  comfortable  poor  appetite    MEDICATION:  acetaminophen  Suppository .. 650 milliGRAM(s) Rectal once  acetaminophen  Suppository .. 650 milliGRAM(s) Rectal every 6 hours PRN  amLODIPine   Tablet 10 milliGRAM(s) Oral daily  aspirin enteric coated 81 milliGRAM(s) Oral daily  dextrose 40% Gel 15 Gram(s) Oral once PRN  dextrose 5%. 1000 milliLiter(s) IV Continuous <Continuous>  dextrose 50% Injectable 12.5 Gram(s) IV Push once  docusate sodium Liquid 100 milliGRAM(s) Oral two times a day  enoxaparin Injectable 40 milliGRAM(s) SubCutaneous every 24 hours  glucagon  Injectable 1 milliGRAM(s) IntraMuscular once PRN  hydrALAZINE Injectable 10 milliGRAM(s) IV Push every 4 hours PRN  insulin lispro (HumaLOG) corrective regimen sliding scale   SubCutaneous three times a day before meals  insulin lispro (HumaLOG) corrective regimen sliding scale   SubCutaneous at bedtime  losartan 100 milliGRAM(s) Oral daily  metoprolol tartrate Injectable 5 milliGRAM(s) IV Push every 6 hours PRN  pantoprazole   Suspension 40 milliGRAM(s) Oral before breakfast  polyethylene glycol 3350 17 Gram(s) Oral daily PRN  senna 2 Tablet(s) Oral at bedtime  simvastatin 40 milliGRAM(s) Oral at bedtime  sodium chloride 0.45%. 1000 milliLiter(s) IV Continuous <Continuous>  sodium chloride 0.9%. 1000 milliLiter(s) IV Continuous <Continuous>  valproic  acid Syrup 250 milliGRAM(s) Enteral Tube three times a day    Vital Signs Last 24 Hrs  T(C): 38.1 (15 Josr 2019 06:27), Max: 38.6 (14 Jun 2019 23:51)  T(F): 100.6 (15 Josr 2019 06:27), Max: 101.5 (14 Jun 2019 23:51)  HR: 126 (15 Josr 2019 04:54) (112 - 130)  BP: 123/76 (15 Josr 2019 04:54) (123/76 - 153/93)  BP(mean): --  RR: 20 (15 Josr 2019 04:54) (18 - 20)  SpO2: 94% (15 Josr 2019 04:54) (93% - 96%)    PHYSICAL EXAM:  GENERAL: NAD, well-groomed, well-developed  EYES:  conjunctiva and sclera clear  ENMT:  Moist mucous membranes,   NECK: Supple, No JVD, Normal thyroid  NERVOUS SYSTEM:  Alert uncommunicative quadriparesis   CHEST/LUNG: Clear    HEART: Regular rate and rhythm; No murmurs, rubs, or gallops  ABDOMEN: Soft, Nontender, Nondistended; Bowel sounds present  EXTREMITIES:  no  edema no  tenderness  SKIN: No rashes   LABS:                        13.9   14.26 )-----------( 422      ( 15 Josr 2019 07:09 )             44.9     06-15    146<H>  |  109<H>  |  86<H>  ----------------------------<  253<H>  3.7   |  25  |  2.16<H>    Ca    8.7      15 Josr 2019 07:09          CAPILLARY BLOOD GLUCOSE      POCT Blood Glucose.: 248 mg/dL (15 Josr 2019 08:04)  POCT Blood Glucose.: 192 mg/dL (14 Jun 2019 21:48)  POCT Blood Glucose.: 207 mg/dL (14 Jun 2019 16:44)  POCT Blood Glucose.: 234 mg/dL (14 Jun 2019 11:10)      RADIOLOGY & ADDITIONAL TESTS:    Imaging reports  Personally Reviewed:  [ x] YES  [ ] NO    Consultant(s) Notes Reviewed:  [x ] YES  [ ] NO    Care Discussed with Consultants/Other Providers [x ] YES  [ ] NO  Problem/Plan - 1:  ·  Problem: Cerebrovascular accident (CVA), unspecified mechanism.  Plan:  for  hospice  transfer    Problem/Plan - 2:  ·  Problem: Hypercholesteremia.     Problem/Plan - 3:  ·  Problem: Essential hypertension. losartan  metoprolol    Problem/Plan - 4:  ·  Problem: Diabetes. insulin  coverage     Problem/Plan - 5:  ·  Problem: Severe protein-calorie malnutrition. ngt  wife  will  decide  on  GT  placement    Problem/Plan - 6:  Problem: History of CVA (cerebrovascular accident). asa  statin    Problem/Plan - 7:  ·  Problem: Hemiparesis affecting left side as late effect of stroke. as  above    Problem/Plan - 8:  ·  Problem: Need for hepatitis C screening test.     Problem/Plan - 9:  ·  Problem: Chronic diastolic (congestive) heart failure.   for transfer to  hospice

## 2019-06-16 LAB
ANION GAP SERPL CALC-SCNC: 13 MMOL/L — SIGNIFICANT CHANGE UP (ref 5–17)
BUN SERPL-MCNC: 118 MG/DL — HIGH (ref 7–23)
CALCIUM SERPL-MCNC: 9.2 MG/DL — SIGNIFICANT CHANGE UP (ref 8.5–10.1)
CHLORIDE SERPL-SCNC: 117 MMOL/L — HIGH (ref 96–108)
CO2 SERPL-SCNC: 25 MMOL/L — SIGNIFICANT CHANGE UP (ref 22–31)
CREAT SERPL-MCNC: 2.71 MG/DL — HIGH (ref 0.5–1.3)
GLUCOSE BLDC GLUCOMTR-MCNC: 243 MG/DL — HIGH (ref 70–99)
GLUCOSE BLDC GLUCOMTR-MCNC: 265 MG/DL — HIGH (ref 70–99)
GLUCOSE BLDC GLUCOMTR-MCNC: 268 MG/DL — HIGH (ref 70–99)
GLUCOSE SERPL-MCNC: 231 MG/DL — HIGH (ref 70–99)
HCT VFR BLD CALC: 41.9 % — SIGNIFICANT CHANGE UP (ref 39–50)
HGB BLD-MCNC: 13 G/DL — SIGNIFICANT CHANGE UP (ref 13–17)
MCHC RBC-ENTMCNC: 29.1 PG — SIGNIFICANT CHANGE UP (ref 27–34)
MCHC RBC-ENTMCNC: 31 GM/DL — LOW (ref 32–36)
MCV RBC AUTO: 93.9 FL — SIGNIFICANT CHANGE UP (ref 80–100)
NRBC # BLD: 0 /100 WBCS — SIGNIFICANT CHANGE UP (ref 0–0)
PLATELET # BLD AUTO: 443 K/UL — HIGH (ref 150–400)
POTASSIUM SERPL-MCNC: 3.6 MMOL/L — SIGNIFICANT CHANGE UP (ref 3.5–5.3)
POTASSIUM SERPL-SCNC: 3.6 MMOL/L — SIGNIFICANT CHANGE UP (ref 3.5–5.3)
RBC # BLD: 4.46 M/UL — SIGNIFICANT CHANGE UP (ref 4.2–5.8)
RBC # FLD: 13.2 % — SIGNIFICANT CHANGE UP (ref 10.3–14.5)
SODIUM SERPL-SCNC: 155 MMOL/L — HIGH (ref 135–145)
WBC # BLD: 10.89 K/UL — HIGH (ref 3.8–10.5)
WBC # FLD AUTO: 10.89 K/UL — HIGH (ref 3.8–10.5)

## 2019-06-16 RX ORDER — ACETAMINOPHEN 500 MG
650 TABLET ORAL ONCE
Refills: 0 | Status: COMPLETED | OUTPATIENT
Start: 2019-06-16 | End: 2019-06-16

## 2019-06-16 RX ADMIN — ENOXAPARIN SODIUM 40 MILLIGRAM(S): 100 INJECTION SUBCUTANEOUS at 21:26

## 2019-06-16 RX ADMIN — Medication 650 MILLIGRAM(S): at 05:43

## 2019-06-16 RX ADMIN — Medication 650 MILLIGRAM(S): at 12:20

## 2019-06-16 RX ADMIN — Medication 2: at 21:26

## 2019-06-16 RX ADMIN — Medication 4: at 08:21

## 2019-06-16 RX ADMIN — Medication 650 MILLIGRAM(S): at 06:28

## 2019-06-16 NOTE — PROGRESS NOTE ADULT - SUBJECTIVE AND OBJECTIVE BOX
INTERVAL HPI/OVERNIGHT EVENTS:    was  unable  to be  transferred  to  hospice 6/15/19    REVIEW OF SYSTEMS:  CONSTITUTIONAL:  lethargic  poorly  arouseable     NECK: No pain or stiffnes  RESPIRATORY: No SOB   CARDIOVASCULAR: No chest pain, palpitations, dizziness,   GASTROINTESTINAL: No abdominal pain. No nausea, vomiting,   NEUROLOGICAL: No headaches, no  blurry  vision no  dizziness  SKIN: No itching,   MUSCULOSKELETAL: No pain    MEDICATION:  acetaminophen  Suppository .. 650 milliGRAM(s) Rectal every 6 hours PRN  amLODIPine   Tablet 10 milliGRAM(s) Oral daily  aspirin enteric coated 81 milliGRAM(s) Oral daily  dextrose 40% Gel 15 Gram(s) Oral once PRN  dextrose 5%. 1000 milliLiter(s) IV Continuous <Continuous>  dextrose 50% Injectable 12.5 Gram(s) IV Push once  docusate sodium Liquid 100 milliGRAM(s) Oral two times a day  enoxaparin Injectable 40 milliGRAM(s) SubCutaneous every 24 hours  glucagon  Injectable 1 milliGRAM(s) IntraMuscular once PRN  hydrALAZINE Injectable 10 milliGRAM(s) IV Push every 4 hours PRN  insulin lispro (HumaLOG) corrective regimen sliding scale   SubCutaneous three times a day before meals  insulin lispro (HumaLOG) corrective regimen sliding scale   SubCutaneous at bedtime  losartan 100 milliGRAM(s) Oral daily  metoprolol tartrate Injectable 5 milliGRAM(s) IV Push every 6 hours PRN  pantoprazole   Suspension 40 milliGRAM(s) Oral before breakfast  polyethylene glycol 3350 17 Gram(s) Oral daily PRN  senna 2 Tablet(s) Oral at bedtime  simvastatin 40 milliGRAM(s) Oral at bedtime  sodium chloride 0.45%. 1000 milliLiter(s) IV Continuous <Continuous>  sodium chloride 0.9%. 1000 milliLiter(s) IV Continuous <Continuous>  valproic  acid Syrup 250 milliGRAM(s) Enteral Tube three times a day    Vital Signs Last 24 Hrs  T(C): 38.3 (16 Jun 2019 06:28), Max: 38.6 (15 Josr 2019 21:35)  T(F): 101 (16 Jun 2019 06:28), Max: 101.5 (15 Josr 2019 21:35)  HR: 115 (16 Jun 2019 05:20) (101 - 115)  BP: 115/62 (16 Jun 2019 05:20) (104/60 - 115/62)  BP(mean): --  RR: 18 (16 Jun 2019 05:20) (17 - 19)  SpO2: 93% (16 Jun 2019 05:20) (93% - 95%)    PHYSICAL EXAM:  GENERAL: NAD, well-groomed, well-developed  EYES:  conjunctiva and sclera clear  ENMT:  Moist mucous membranes,   NECK: Supple, No JVD, Normal thyroid  NERVOUS SYSTEM:  Alert oriented   no  focal  deficits;   CHEST/LUNG: Clear    HEART: Regular rate and rhythm; No murmurs, rubs, or gallops  ABDOMEN: Soft, Nontender, Nondistended; Bowel sounds present  EXTREMITIES:  no  edema no  tenderness  SKIN: No rashes   LABS:                        13.0   10.89 )-----------( 443      ( 16 Jun 2019 07:09 )             41.9     06-16    155<H>  |  117<H>  |  118<H>  ----------------------------<  231<H>  3.6   |  25  |  2.71<H>    Ca    9.2      16 Jun 2019 07:09          CAPILLARY BLOOD GLUCOSE      POCT Blood Glucose.: 243 mg/dL (16 Jun 2019 07:23)  POCT Blood Glucose.: 193 mg/dL (15 Josr 2019 21:34)  POCT Blood Glucose.: 169 mg/dL (15 Josr 2019 17:15)  POCT Blood Glucose.: 249 mg/dL (15 Josr 2019 11:29)      RADIOLOGY & ADDITIONAL TESTS:    Imaging reports  Personally Reviewed:  [x ] YES  [ ] NO    Consultant(s) Notes Reviewed:  [x ] YES  [ ] NO    Care Discussed with Consultants/Other Providers [x ] YES  [ ] NO  Problem/Plan - 1:  ·  Problem: Cerebrovascular accident (CVA), unspecified mechanism.  Plan:  for  hospice  transfer    Problem/Plan - 2:  ·  Problem: Hypercholesteremia.     Problem/Plan - 3:  ·  Problem: Essential hypertension. losartan  metoprolol    Problem/Plan - 4:  ·  Problem: Diabetes. insulin  coverage     Problem/Plan - 5:  ·  Problem: Severe protein-calorie malnutrition. ngt  wife  will  decide  on  GT  placement    Problem/Plan - 6:  Problem: History of CVA (cerebrovascular accident).     Problem/Plan - 7:  ·  Problem: Hemiparesis affecting left side as late effect of stroke. as  above    Problem/Plan - 8:  dementia  failure  to  thrive  comfort  care  Problem/Plan - 9:  ·  Problem: Chronic diastolic (congestive) heart failure.   for transfer to  hospice

## 2019-06-17 LAB
ANION GAP SERPL CALC-SCNC: 13 MMOL/L — SIGNIFICANT CHANGE UP (ref 5–17)
BUN SERPL-MCNC: 149 MG/DL — HIGH (ref 7–23)
CALCIUM SERPL-MCNC: 8.7 MG/DL — SIGNIFICANT CHANGE UP (ref 8.5–10.1)
CHLORIDE SERPL-SCNC: 120 MMOL/L — HIGH (ref 96–108)
CO2 SERPL-SCNC: 23 MMOL/L — SIGNIFICANT CHANGE UP (ref 22–31)
CREAT SERPL-MCNC: 3.75 MG/DL — HIGH (ref 0.5–1.3)
GLUCOSE BLDC GLUCOMTR-MCNC: 211 MG/DL — HIGH (ref 70–99)
GLUCOSE BLDC GLUCOMTR-MCNC: 211 MG/DL — HIGH (ref 70–99)
GLUCOSE BLDC GLUCOMTR-MCNC: 231 MG/DL — HIGH (ref 70–99)
GLUCOSE BLDC GLUCOMTR-MCNC: 235 MG/DL — HIGH (ref 70–99)
GLUCOSE SERPL-MCNC: 262 MG/DL — HIGH (ref 70–99)
HCT VFR BLD CALC: 42.7 % — SIGNIFICANT CHANGE UP (ref 39–50)
HGB BLD-MCNC: 13.3 G/DL — SIGNIFICANT CHANGE UP (ref 13–17)
MCHC RBC-ENTMCNC: 29.4 PG — SIGNIFICANT CHANGE UP (ref 27–34)
MCHC RBC-ENTMCNC: 31.1 GM/DL — LOW (ref 32–36)
MCV RBC AUTO: 94.5 FL — SIGNIFICANT CHANGE UP (ref 80–100)
NRBC # BLD: 0 /100 WBCS — SIGNIFICANT CHANGE UP (ref 0–0)
PLATELET # BLD AUTO: 448 K/UL — HIGH (ref 150–400)
POTASSIUM SERPL-MCNC: 3.7 MMOL/L — SIGNIFICANT CHANGE UP (ref 3.5–5.3)
POTASSIUM SERPL-SCNC: 3.7 MMOL/L — SIGNIFICANT CHANGE UP (ref 3.5–5.3)
RBC # BLD: 4.52 M/UL — SIGNIFICANT CHANGE UP (ref 4.2–5.8)
RBC # FLD: 13.4 % — SIGNIFICANT CHANGE UP (ref 10.3–14.5)
SODIUM SERPL-SCNC: 156 MMOL/L — HIGH (ref 135–145)
WBC # BLD: 10.41 K/UL — SIGNIFICANT CHANGE UP (ref 3.8–10.5)
WBC # FLD AUTO: 10.41 K/UL — SIGNIFICANT CHANGE UP (ref 3.8–10.5)

## 2019-06-17 RX ADMIN — Medication 81 MILLIGRAM(S): at 11:31

## 2019-06-17 RX ADMIN — Medication 4: at 08:01

## 2019-06-17 RX ADMIN — Medication 4: at 16:11

## 2019-06-17 RX ADMIN — Medication 4: at 11:31

## 2019-06-17 RX ADMIN — ENOXAPARIN SODIUM 40 MILLIGRAM(S): 100 INJECTION SUBCUTANEOUS at 21:20

## 2019-06-17 NOTE — PROGRESS NOTE ADULT - ASSESSMENT
Subjective Complaints:  Historian:             Vital Signs Last 24 Hrs  T(C): 37 (17 Jun 2019 18:15), Max: 37.9 (16 Jun 2019 23:46)  T(F): 98.6 (17 Jun 2019 18:15), Max: 100.2 (16 Jun 2019 23:46)  HR: 111 (17 Jun 2019 18:15) (109 - 122)  BP: 98/64 (17 Jun 2019 18:15) (91/64 - 98/64)  BP(mean): --  RR: 18 (17 Jun 2019 18:15) (17 - 18)  SpO2: 96% (17 Jun 2019 18:15) (95% - 96%)    GENERAL PHYSICAL EXAM:  General:  Appears stated age, well-groomed, well-nourished, no distress  HEENT:  NC/AT, patent nares w/ pink mucosa, OP clear w/o lesions, PERRL, EOMI, conjunctivae clear, no thyromegaly, nodules, adenopathy, no JVD  Chest:  Full & symmetric excursion, no increased effort, breath sounds clear  Cardiovascular:  Regular rhythm, S1, S2, no murmur/rub/S3/S4, no carotid/femoral/abdominal bruit, radial/pedal pulses 2+, no edema  Abdomen:  Soft, non-tender, non-distended, normoactive bowel sounds, no HSM  Extremities:  Gait & station:   Digits:   Nails:   Joints, Bones, Muscles:   ROM:   Stability:  Skin:  No rash/erythema/ecchymoses/petechiae/wounds/abscess/warm/dry  Musculoskeletal:  Full ROM in all joints w/o swelling/tenderness/effusion        LABS:                        13.3   10.41 )-----------( 448      ( 17 Jun 2019 07:56 )             42.7     06-17    156<H>  |  120<H>  |  149<H>  ----------------------------<  262<H>  3.7   |  23  |  3.75<H>    Ca    8.7      17 Jun 2019 07:56            RADIOLOGY & ADDITIONAL STUDIES:        Neurology Progress Note:      Mental Status: letaghrgic arousable open eyes  deosnot follws comands       Cranial Nerves: deferd       Motor:   quadroparesis old cva         Sensory: intaxt tro pain       Cerebellar: deferd       Gait:unable to walk       Assesment/Plan: old clarice cva for palliative care  discuss wioth family poor prognosis  not eating

## 2019-06-17 NOTE — PROGRESS NOTE ADULT - SUBJECTIVE AND OBJECTIVE BOX
INTERVAL HPI/OVERNIGHT EVENTS:        REVIEW OF SYSTEMS:  CONSTITUTIONAL: lethargic        MEDICATION:  acetaminophen  Suppository .. 650 milliGRAM(s) Rectal every 6 hours PRN  amLODIPine   Tablet 10 milliGRAM(s) Oral daily  aspirin enteric coated 81 milliGRAM(s) Oral daily  dextrose 40% Gel 15 Gram(s) Oral once PRN  dextrose 5%. 1000 milliLiter(s) IV Continuous <Continuous>  dextrose 50% Injectable 12.5 Gram(s) IV Push once  docusate sodium Liquid 100 milliGRAM(s) Oral two times a day  enoxaparin Injectable 40 milliGRAM(s) SubCutaneous every 24 hours  glucagon  Injectable 1 milliGRAM(s) IntraMuscular once PRN  hydrALAZINE Injectable 10 milliGRAM(s) IV Push every 4 hours PRN  insulin lispro (HumaLOG) corrective regimen sliding scale   SubCutaneous three times a day before meals  insulin lispro (HumaLOG) corrective regimen sliding scale   SubCutaneous at bedtime  losartan 100 milliGRAM(s) Oral daily  metoprolol tartrate Injectable 5 milliGRAM(s) IV Push every 6 hours PRN  pantoprazole   Suspension 40 milliGRAM(s) Oral before breakfast  polyethylene glycol 3350 17 Gram(s) Oral daily PRN  senna 2 Tablet(s) Oral at bedtime  simvastatin 40 milliGRAM(s) Oral at bedtime  sodium chloride 0.45%. 1000 milliLiter(s) IV Continuous <Continuous>  sodium chloride 0.9%. 1000 milliLiter(s) IV Continuous <Continuous>  valproic  acid Syrup 250 milliGRAM(s) Enteral Tube three times a day    Vital Signs Last 24 Hrs  T(C): 37.4 (17 Jun 2019 05:14), Max: 38.5 (16 Jun 2019 17:10)  T(F): 99.4 (17 Jun 2019 05:14), Max: 101.3 (16 Jun 2019 17:10)  HR: 112 (17 Jun 2019 05:14) (110 - 122)  BP: 94/55 (17 Jun 2019 05:14) (91/64 - 99/64)  BP(mean): --  RR: 17 (17 Jun 2019 05:14) (16 - 17)  SpO2: 96% (17 Jun 2019 05:14) (95% - 97%)    PHYSICAL EXAM:  GENERAL: NAD, well-groomed, well-developed  EYES:  conjunctiva and sclera clear   NECK: Supple, No JVD, Normal thyroid  NERVOUS SYSTEM:  lethargic     CHEST/LUNG: Clear    HEART: Regular rate and rhythm; No murmurs, rubs, or gallops  ABDOMEN: Soft, Nontender, Nondistended; Bowel sounds present  EXTREMITIES:  no  edema no  tenderness  SKIN: No rashes   LABS:                        13.3   10.41 )-----------( 448      ( 17 Jun 2019 07:56 )             42.7     06-17    156<H>  |  120<H>  |  149<H>  ----------------------------<  262<H>  3.7   |  23  |  3.75<H>    Ca    8.7      17 Jun 2019 07:56          CAPILLARY BLOOD GLUCOSE      POCT Blood Glucose.: 235 mg/dL (17 Jun 2019 07:59)  POCT Blood Glucose.: 268 mg/dL (16 Jun 2019 21:17)  POCT Blood Glucose.: 265 mg/dL (16 Jun 2019 11:12)      RADIOLOGY & ADDITIONAL TESTS:    Imaging reports  Personally Reviewed:  [ ] YES  [ ] NO    Consultant(s) Notes Reviewed:  [ ] YES  [ ] NO    Care Discussed with Consultants/Other Providers [ ] YES  [ ] NO  Problem/Plan - 1:  ·  Problem: Cerebrovascular accident (CVA), unspecified mechanism.  Plan:  comfort  care for  hospice  transfer    Problem/Plan - 2:  ·  Problem: Hypercholesteremia.     Problem/Plan - 3:  ·  Problem: Essential hypertension. losartan  metoprolol    Problem/Plan - 4:  ·  Problem: Diabetes. insulin  coverage     Problem/Plan - 5:  ·  Problem: Severe protein-calorie malnutrition. ngt  wife  will  decide  on  GT  placement    Problem/Plan - 6:  Problem: History of CVA (cerebrovascular accident).     Problem/Plan - 7:  ·  Problem: Hemiparesis affecting left side as late effect of stroke. as  above    Problem/Plan - 8:  dementia  failure  to  thrive  comfort  care  Problem/Plan - 9:  ·  Problem: Chronic diastolic (congestive) heart failure.   for transfer to  hospice

## 2019-06-18 ENCOUNTER — TRANSCRIPTION ENCOUNTER (OUTPATIENT)
Age: 69
End: 2019-06-18

## 2019-06-18 VITALS
DIASTOLIC BLOOD PRESSURE: 62 MMHG | HEART RATE: 108 BPM | OXYGEN SATURATION: 96 % | SYSTOLIC BLOOD PRESSURE: 101 MMHG | TEMPERATURE: 99 F | RESPIRATION RATE: 18 BRPM

## 2019-06-18 LAB
ANION GAP SERPL CALC-SCNC: 19 MMOL/L — HIGH (ref 5–17)
BUN SERPL-MCNC: 179 MG/DL — HIGH (ref 7–23)
CALCIUM SERPL-MCNC: 8.7 MG/DL — SIGNIFICANT CHANGE UP (ref 8.5–10.1)
CHLORIDE SERPL-SCNC: 123 MMOL/L — HIGH (ref 96–108)
CO2 SERPL-SCNC: 19 MMOL/L — LOW (ref 22–31)
CREAT SERPL-MCNC: 5.32 MG/DL — HIGH (ref 0.5–1.3)
GLUCOSE BLDC GLUCOMTR-MCNC: 210 MG/DL — HIGH (ref 70–99)
GLUCOSE BLDC GLUCOMTR-MCNC: 265 MG/DL — HIGH (ref 70–99)
GLUCOSE BLDC GLUCOMTR-MCNC: 273 MG/DL — HIGH (ref 70–99)
GLUCOSE SERPL-MCNC: 305 MG/DL — HIGH (ref 70–99)
HCT VFR BLD CALC: 45.7 % — SIGNIFICANT CHANGE UP (ref 39–50)
HGB BLD-MCNC: 14.2 G/DL — SIGNIFICANT CHANGE UP (ref 13–17)
MCHC RBC-ENTMCNC: 29.5 PG — SIGNIFICANT CHANGE UP (ref 27–34)
MCHC RBC-ENTMCNC: 31.1 GM/DL — LOW (ref 32–36)
MCV RBC AUTO: 94.8 FL — SIGNIFICANT CHANGE UP (ref 80–100)
NRBC # BLD: 0 /100 WBCS — SIGNIFICANT CHANGE UP (ref 0–0)
PLATELET # BLD AUTO: 450 K/UL — HIGH (ref 150–400)
POTASSIUM SERPL-MCNC: 4 MMOL/L — SIGNIFICANT CHANGE UP (ref 3.5–5.3)
POTASSIUM SERPL-SCNC: 4 MMOL/L — SIGNIFICANT CHANGE UP (ref 3.5–5.3)
RBC # BLD: 4.82 M/UL — SIGNIFICANT CHANGE UP (ref 4.2–5.8)
RBC # FLD: 13.5 % — SIGNIFICANT CHANGE UP (ref 10.3–14.5)
SODIUM SERPL-SCNC: 161 MMOL/L — CRITICAL HIGH (ref 135–145)
WBC # BLD: 9.61 K/UL — SIGNIFICANT CHANGE UP (ref 3.8–10.5)
WBC # FLD AUTO: 9.61 K/UL — SIGNIFICANT CHANGE UP (ref 3.8–10.5)

## 2019-06-18 RX ORDER — ROBINUL 0.2 MG/ML
0.2 INJECTION INTRAMUSCULAR; INTRAVENOUS
Qty: 0 | Refills: 0 | DISCHARGE
Start: 2019-06-18

## 2019-06-18 RX ORDER — SODIUM CHLORIDE 9 MG/ML
1000 INJECTION, SOLUTION INTRAVENOUS
Refills: 0 | Status: DISCONTINUED | OUTPATIENT
Start: 2019-06-18 | End: 2019-06-18

## 2019-06-18 RX ORDER — ACETAMINOPHEN 500 MG
1 TABLET ORAL
Qty: 0 | Refills: 0 | DISCHARGE
Start: 2019-06-18

## 2019-06-18 RX ORDER — ROBINUL 0.2 MG/ML
0.2 INJECTION INTRAMUSCULAR; INTRAVENOUS EVERY 6 HOURS
Refills: 0 | Status: DISCONTINUED | OUTPATIENT
Start: 2019-06-18 | End: 2019-06-18

## 2019-06-18 RX ADMIN — Medication 6: at 08:21

## 2019-06-18 RX ADMIN — ROBINUL 0.2 MILLIGRAM(S): 0.2 INJECTION INTRAMUSCULAR; INTRAVENOUS at 13:11

## 2019-06-18 RX ADMIN — ROBINUL 0.2 MILLIGRAM(S): 0.2 INJECTION INTRAMUSCULAR; INTRAVENOUS at 17:39

## 2019-06-18 RX ADMIN — SODIUM CHLORIDE 75 MILLILITER(S): 9 INJECTION, SOLUTION INTRAVENOUS at 08:21

## 2019-06-18 RX ADMIN — Medication 6: at 11:59

## 2019-06-18 NOTE — DISCHARGE NOTE NURSING/CASE MANAGEMENT/SOCIAL WORK - NSDCDPATPORTLINK_GEN_ALL_CORE
You can access the [facility] Portal, offered by [facility], by registering with the following website: [email or web address]

## 2019-06-18 NOTE — DISCHARGE NOTE NURSING/CASE MANAGEMENT/SOCIAL WORK - NSDCPEPTSTRK_GEN_ALL_CORE
Call 911 for stroke/Need for follow up after discharge/Stroke education booklet/Stroke warning signs and symptoms/Signs and symptoms of stroke/Risk factors for stroke/Prescribed medications/Stroke support groups for patients, families, and friends

## 2019-06-18 NOTE — PROGRESS NOTE ADULT - SUBJECTIVE AND OBJECTIVE BOX
INTERVAL HPI/OVERNIGHT EVENTS:  patient  not  transferred  to  [facility]  6/17/19      REVIEW OF SYSTEMS:  CONSTITUTIONAL:  patient  lethargic  poorly  arouseable    MEDICATION:  acetaminophen  Suppository .. 650 milliGRAM(s) Rectal every 6 hours PRN  amLODIPine   Tablet 10 milliGRAM(s) Oral daily  aspirin enteric coated 81 milliGRAM(s) Oral daily  dextrose 40% Gel 15 Gram(s) Oral once PRN  dextrose 5%. 1000 milliLiter(s) IV Continuous <Continuous>  dextrose 50% Injectable 12.5 Gram(s) IV Push once  docusate sodium Liquid 100 milliGRAM(s) Oral two times a day  enoxaparin Injectable 40 milliGRAM(s) SubCutaneous every 24 hours  glucagon  Injectable 1 milliGRAM(s) IntraMuscular once PRN  hydrALAZINE Injectable 10 milliGRAM(s) IV Push every 4 hours PRN  insulin lispro (HumaLOG) corrective regimen sliding scale   SubCutaneous three times a day before meals  insulin lispro (HumaLOG) corrective regimen sliding scale   SubCutaneous at bedtime  losartan 100 milliGRAM(s) Oral daily  metoprolol tartrate Injectable 5 milliGRAM(s) IV Push every 6 hours PRN  pantoprazole   Suspension 40 milliGRAM(s) Oral before breakfast  polyethylene glycol 3350 17 Gram(s) Oral daily PRN  senna 2 Tablet(s) Oral at bedtime  simvastatin 40 milliGRAM(s) Oral at bedtime  sodium chloride 0.45%. 1000 milliLiter(s) IV Continuous <Continuous>  sodium chloride 0.9%. 1000 milliLiter(s) IV Continuous <Continuous>  valproic  acid Syrup 250 milliGRAM(s) Enteral Tube three times a day    Vital Signs Last 24 Hrs  T(C): 37 (18 Jun 2019 05:00), Max: 37.2 (17 Jun 2019 10:39)  T(F): 98.6 (18 Jun 2019 05:00), Max: 98.9 (17 Jun 2019 10:39)  HR: 109 (18 Jun 2019 05:00) (109 - 111)  BP: 91/53 (18 Jun 2019 05:00) (90/63 - 98/64)  BP(mean): --  RR: 16 (18 Jun 2019 05:00) (16 - 18)  SpO2: 96% (18 Jun 2019 05:00) (96% - 96%)    PHYSICAL EXAM:  GENERAL: NAD,  EYES:  conjunctiva and sclera clear   NECK: Supple, No JVD, Normal thyroid  NERVOUS SYSTEM:  lethargic  opens  eyes  with  nicferous  stimulus     CHEST/LUNG: clear    HEART: Regular rate and rhythm; No murmurs, rubs, or gallops  ABDOMEN: Soft, Nontender, Nondistended; Bowel sounds present  EXTREMITIES:  no  edema no  tenderness  SKIN: No rashes   LABS:                        13.3   10.41 )-----------( 448      ( 17 Jun 2019 07:56 )             42.7     06-17    156<H>  |  120<H>  |  149<H>  ----------------------------<  262<H>  3.7   |  23  |  3.75<H>    Ca    8.7      17 Jun 2019 07:56          CAPILLARY BLOOD GLUCOSE      POCT Blood Glucose.: 231 mg/dL (17 Jun 2019 21:11)  POCT Blood Glucose.: 211 mg/dL (17 Jun 2019 15:48)  POCT Blood Glucose.: 211 mg/dL (17 Jun 2019 11:30)  POCT Blood Glucose.: 235 mg/dL (17 Jun 2019 07:59)      RADIOLOGY & ADDITIONAL TESTS:    Imaging reports  Personally Reviewed:  [ x] YES  [ ] NO    Consultant(s) Notes Reviewed:  [x ] YES  [ ] NO    Care Discussed with Consultants/Other Providers [x ] YES  [ ] NO  Problem/Plan - 1:  ·  Problem: Cerebrovascular accident (CVA), unspecified mechanism.  Plan:  comfort  care for  hospice  transfer    Problem/Plan - 2:  ·  Problem: Hypercholesteremia.     Problem/Plan - 3:  ·  Problem: Essential hypertension. unable to  swallow start  clonidine  patch    Problem/Plan - 4:  ·  Problem: Diabetes. insulin  coverage     Problem/Plan - 5:  ·  Problem: Severe protein-calorie malnutrition. ngt  wife  will  decide  on  GT  placement    Problem/Plan - 6:  Problem: History of CVA (cerebrovascular accident).     Problem/Plan - 7:  ·  Problem: Hemiparesis affecting left side as late effect of stroke. as  above    Problem/Plan - 8:  dementia  failure  to  thrive  comfort  care  Problem/Plan - 9:  ·  Problem: Chronic diastolic (congestive) heart failure.   hypernatremia  acute  on  chronic  renal  failure  for transfer to  hospice INTERVAL HPI/OVERNIGHT EVENTS:  patient  not  [facility]red  to  [facility]  6/17/19      REVIEW OF SYSTEMS:  CONSTITUTIONAL:  patient  lethargic  poorly  arouseable    MEDICATION:  acetaminophen  Suppository .. 650 milliGRAM(s) Rectal every 6 hours PRN  amLODIPine   Tablet 10 milliGRAM(s) Oral daily  aspirin enteric coated 81 milliGRAM(s) Oral daily  dextrose 40% Gel 15 Gram(s) Oral once PRN  dextrose 5%. 1000 milliLiter(s) IV Continuous <Continuous>  dextrose 50% Injectable 12.5 Gram(s) IV Push once  docusate sodium Liquid 100 milliGRAM(s) Oral two times a day  enoxaparin Injectable 40 milliGRAM(s) SubCutaneous every 24 hours  glucagon  Injectable 1 milliGRAM(s) IntraMuscular once PRN  hydrALAZINE Injectable 10 milliGRAM(s) IV Push every 4 hours PRN  insulin lispro (HumaLOG) corrective regimen sliding scale   SubCutaneous three times a day before meals  insulin lispro (HumaLOG) corrective regimen sliding scale   SubCutaneous at bedtime  losartan 100 milliGRAM(s) Oral daily  metoprolol tartrate Injectable 5 milliGRAM(s) IV Push every 6 hours PRN  pantoprazole   Suspension 40 milliGRAM(s) Oral before breakfast  polyethylene glycol 3350 17 Gram(s) Oral daily PRN  senna 2 Tablet(s) Oral at bedtime  simvastatin 40 milliGRAM(s) Oral at bedtime  sodium chloride 0.45%. 1000 milliLiter(s) IV Continuous <Continuous>  sodium chloride 0.9%. 1000 milliLiter(s) IV Continuous <Continuous>  valproic  acid Syrup 250 milliGRAM(s) Enteral Tube three times a day    Vital Signs Last 24 Hrs  T(C): 37 (18 Jun 2019 05:00), Max: 37.2 (17 Jun 2019 10:39)  T(F): 98.6 (18 Jun 2019 05:00), Max: 98.9 (17 Jun 2019 10:39)  HR: 109 (18 Jun 2019 05:00) (109 - 111)  BP: 91/53 (18 Jun 2019 05:00) (90/63 - 98/64)  BP(mean): --  RR: 16 (18 Jun 2019 05:00) (16 - 18)  SpO2: 96% (18 Jun 2019 05:00) (96% - 96%)    PHYSICAL EXAM:  GENERAL: NAD,  EYES:  conjunctiva and sclera clear   NECK: Supple, No JVD, Normal thyroid  NERVOUS SYSTEM:  lethargic  opens  eyes  with  nicferous  stimulus     CHEST/LUNG: clear    HEART: Regular rate and rhythm; No murmurs, rubs, or gallops  ABDOMEN: Soft, Nontender, Nondistended; Bowel sounds present  EXTREMITIES:  no  edema no  tenderness  SKIN: No rashes   LABS:                        13.3   10.41 )-----------( 448      ( 17 Jun 2019 07:56 )             42.7     06-17    156<H>  |  120<H>  |  149<H>  ----------------------------<  262<H>  3.7   |  23  |  3.75<H>    Ca    8.7      17 Jun 2019 07:56          CAPILLARY BLOOD GLUCOSE      POCT Blood Glucose.: 231 mg/dL (17 Jun 2019 21:11)  POCT Blood Glucose.: 211 mg/dL (17 Jun 2019 15:48)  POCT Blood Glucose.: 211 mg/dL (17 Jun 2019 11:30)  POCT Blood Glucose.: 235 mg/dL (17 Jun 2019 07:59)      RADIOLOGY & ADDITIONAL TESTS:    Imaging reports  Personally Reviewed:  [ x] YES  [ ] NO    Consultant(s) Notes Reviewed:  [x ] YES  [ ] NO    Care Discussed with Consultants/Other Providers [x ] YES  [ ] NO  Problem/Plan - 1:  ·  Problem: Cerebrovascular accident (CVA), unspecified mechanism.  Plan:  comfort  care for  hospice  transfer    Problem/Plan - 2:  ·  Problem: Hypercholesteremia.     Problem/Plan - 3:  ·  Problem: Essential hypertension. unable to  swallow start  clonidine  patch    Problem/Plan - 4:  ·  Problem: Diabetes. insulin  coverage     Problem/Plan - 5:  ·  Problem: Severe protein-calorie malnutrition. ngt  wife  will  decide  on  GT  placement    Problem/Plan - 6:  Problem: History of CVA (cerebrovascular accident).     Problem/Plan - 7:  ·  Problem: Hemiparesis affecting left side as late effect of stroke. as  above    Problem/Plan - 8:  dementia  failure  to  thrive  comfort  care  Problem/Plan - 9:  ·  Problem: Chronic diastolic (congestive) heart failure.   hypernatremia  acute  on  chronic  renal  failure  for transfer to  hospice  discussed  with  pt's  wife  c/o  congestion  will  add  glyciopyrrolate  as  comfort  measure

## 2019-06-18 NOTE — GOALS OF CARE CONVERSATION - PERSONAL ADVANCE DIRECTIVE - CONVERSATION DETAILS
Pt evaluated by Dr. Soto for In pt this AM. Pt not approved for In pt at this time. Pt does not have an active sx that needs management. Remains appropriate for home/LTC facility. Will cont to f/u as needed.       Patty Shi RN

## 2019-06-18 NOTE — PROGRESS NOTE ADULT - PROVIDER SPECIALTY LIST ADULT
Internal Medicine
Neurology
Internal Medicine

## 2019-06-23 DIAGNOSIS — R29.810 FACIAL WEAKNESS: ICD-10-CM

## 2019-06-23 DIAGNOSIS — Z86.73 PERSONAL HISTORY OF TRANSIENT ISCHEMIC ATTACK (TIA), AND CEREBRAL INFARCTION WITHOUT RESIDUAL DEFICITS: ICD-10-CM

## 2019-06-23 DIAGNOSIS — Z79.84 LONG TERM (CURRENT) USE OF ORAL HYPOGLYCEMIC DRUGS: ICD-10-CM

## 2019-06-23 DIAGNOSIS — Z66 DO NOT RESUSCITATE: ICD-10-CM

## 2019-06-23 DIAGNOSIS — Z51.5 ENCOUNTER FOR PALLIATIVE CARE: ICD-10-CM

## 2019-06-23 DIAGNOSIS — I67.82 CEREBRAL ISCHEMIA: ICD-10-CM

## 2019-06-23 DIAGNOSIS — E78.00 PURE HYPERCHOLESTEROLEMIA, UNSPECIFIED: ICD-10-CM

## 2019-06-23 DIAGNOSIS — I13.0 HYPERTENSIVE HEART AND CHRONIC KIDNEY DISEASE WITH HEART FAILURE AND STAGE 1 THROUGH STAGE 4 CHRONIC KIDNEY DISEASE, OR UNSPECIFIED CHRONIC KIDNEY DISEASE: ICD-10-CM

## 2019-06-23 DIAGNOSIS — Z79.1 LONG TERM (CURRENT) USE OF NON-STEROIDAL ANTI-INFLAMMATORIES (NSAID): ICD-10-CM

## 2019-06-23 DIAGNOSIS — I69.354 HEMIPLEGIA AND HEMIPARESIS FOLLOWING CEREBRAL INFARCTION AFFECTING LEFT NON-DOMINANT SIDE: ICD-10-CM

## 2019-06-23 DIAGNOSIS — N18.9 CHRONIC KIDNEY DISEASE, UNSPECIFIED: ICD-10-CM

## 2019-06-23 DIAGNOSIS — R13.10 DYSPHAGIA, UNSPECIFIED: ICD-10-CM

## 2019-06-23 DIAGNOSIS — F01.50 VASCULAR DEMENTIA WITHOUT BEHAVIORAL DISTURBANCE: ICD-10-CM

## 2019-06-23 DIAGNOSIS — N17.9 ACUTE KIDNEY FAILURE, UNSPECIFIED: ICD-10-CM

## 2019-06-23 DIAGNOSIS — E11.22 TYPE 2 DIABETES MELLITUS WITH DIABETIC CHRONIC KIDNEY DISEASE: ICD-10-CM

## 2019-06-23 DIAGNOSIS — I50.32 CHRONIC DIASTOLIC (CONGESTIVE) HEART FAILURE: ICD-10-CM

## 2019-06-23 DIAGNOSIS — R62.7 ADULT FAILURE TO THRIVE: ICD-10-CM

## 2019-06-23 DIAGNOSIS — E87.0 HYPEROSMOLALITY AND HYPERNATREMIA: ICD-10-CM

## 2019-06-23 DIAGNOSIS — Z78.1 PHYSICAL RESTRAINT STATUS: ICD-10-CM

## 2019-06-23 DIAGNOSIS — I63.9 CEREBRAL INFARCTION, UNSPECIFIED: ICD-10-CM

## 2019-06-23 DIAGNOSIS — Z79.899 OTHER LONG TERM (CURRENT) DRUG THERAPY: ICD-10-CM

## 2019-06-23 DIAGNOSIS — M62.432 CONTRACTURE OF MUSCLE, LEFT FOREARM: ICD-10-CM

## 2019-06-23 DIAGNOSIS — M62.422 CONTRACTURE OF MUSCLE, LEFT UPPER ARM: ICD-10-CM

## 2019-06-23 DIAGNOSIS — Z79.82 LONG TERM (CURRENT) USE OF ASPIRIN: ICD-10-CM

## 2019-06-23 DIAGNOSIS — E43 UNSPECIFIED SEVERE PROTEIN-CALORIE MALNUTRITION: ICD-10-CM

## 2021-07-21 NOTE — ED ADULT TRIAGE NOTE - NS ED TRIAGE AVPU SCALE
Alert-The patient is alert, awake and responds to voice. The patient is oriented to time, place, and person. The triage nurse is able to obtain subjective information. nausea/vomiting

## 2023-10-26 NOTE — STROKE CODE NOTE - PROVIDER ASSESSMENT_DATE AND TIME
Writer received vm cb from patient. Writer called back and spoke with patient regarding CAC score.    CT CAC Scan result 0      Discussed heart healthy lifestyle and importance of risk factor modification with patient, as well as importance of continued compliance with PCP screenings for HTN, diabetes, cholesterol, etc.     Patient will fu with provider to discuss results.    Pt verbalized understanding and denies any further questions or concerns at present.       19-Jun-2017 16:14